# Patient Record
Sex: MALE | Race: WHITE | Employment: UNEMPLOYED | URBAN - METROPOLITAN AREA
[De-identification: names, ages, dates, MRNs, and addresses within clinical notes are randomized per-mention and may not be internally consistent; named-entity substitution may affect disease eponyms.]

---

## 2016-12-31 NOTE — PROGRESS NOTES
.  Lance Perdomo is a 36 year old male here for the following issues:    Left leg injury  Lance is a 35 yo male who reports he dropped a box of very heavy frozen chicken while working at [email or web address] in December 2016. It hit against the right side of his chest and hit the anterior left shin. He has bruising and local pain at the left anterior shin. He no longer works at [email or web address], this was seasonal work. His last day was 12/31/16. He is taking Aleve for pain.     Anxiety  He is interested in medication for treatment of anxiety. He has seen a psychiatrist, Dr Pedraza. Last visit was in Nov 2016 but this provider is no longer covered by his insurance. He is not taking any meds at this time. He has a history of alcoholism and reports he is drinking about 2 drinks per day. He saw a therapist on 1/6/17 (Dr. Enamorado, family counseling therapist). He has a meeting with his  on 1/12/17 regarding a DUI.       Alcoholism  He has been in numerous treatment facilities and is unable to stay sober.     Discussed referral to the [facility] psychiatry department for management of his anxiety and his alcoholism. He would like a referral.     Patient Active Problem List   Diagnosis     Anxiety     Chemical dependency (H)     Alcoholism (H)       Current Outpatient Prescriptions   Medication Sig Dispense Refill     citalopram (CELEXA) 20 MG tablet Take 1 tablet (20 mg) by mouth daily 60 tablet      hydrOXYzine (ATARAX) 25 MG tablet Take one-two tablets QID for anxiety 120 tablet      cephalexin 500 MG tablet Take 500 mg by mouth 4 times daily 40 tablet 0     doxycycline Monohydrate 100 MG TABS Take 2 tablets twice a day on day 1-3 then   Take 1 tablet twice daily on day 4-10 then  0     traMADol (ULTRAM) 50 MG tablet Take 1-2 tablets q 6 hr 28 tablet      naltrexone (DEPADE;REVIA) 50 MG tablet Take 1 tablet (50 mg) by mouth daily 30 tablet 0       Allergies   Allergen Reactions     Amoxicillin Nausea and Vomiting      vomiting     Augmentin Nausea and Vomiting     vomiting        EXAM  /102 mmHg  Pulse 96  Temp(Src) 98.2  F (36.8  C)  Wt 174 lb (78.926 kg)  SpO2 96%  Gen: Alert, pleasant, NAD  Chest wall: ecchymoses along right anterior chest wall, no crepitus, local tenderness  Right lower extremity: ecchymosis and local swelling along anterior right tibia, no open area      Assessment:  (S89.92XA) Injury of lower extremity, left, initial encounter  (primary encounter diagnosis)  Comment: work related injury, heavy box of chicken fell onto left anterior shin, contusion  Plan: recommend RICE. He declines xray today. Contact MD if pain not resolving, worsening    (F10.20) Alcoholism (H)  Comment: currently drinking  Plan: MENTAL HEALTH REFERRAL        Refer to psychiatry at the [facility]    (F41.9) Anxiety  Comment: currently poorly controlled, triggering drinking  Plan: MENTAL HEALTH REFERRAL, hydrOXYzine (VISTARIL)         25 MG capsule        For now refill Hydroxyzine, refer to Psychiatry at the [facility]    Willow Damian MD  Internal Medicine/Pediatrics

## 2017-01-04 ENCOUNTER — OFFICE VISIT (OUTPATIENT)
Dept: FAMILY MEDICINE | Facility: CLINIC | Age: 37
End: 2017-01-04

## 2017-01-04 VITALS
HEART RATE: 96 BPM | DIASTOLIC BLOOD PRESSURE: 102 MMHG | OXYGEN SATURATION: 96 % | BODY MASS INDEX: 24.97 KG/M2 | TEMPERATURE: 98.2 F | WEIGHT: 174 LBS | SYSTOLIC BLOOD PRESSURE: 148 MMHG

## 2017-01-04 DIAGNOSIS — F10.20 ALCOHOLISM (H): ICD-10-CM

## 2017-01-04 DIAGNOSIS — S89.92XA INJURY OF LOWER EXTREMITY, LEFT, INITIAL ENCOUNTER: Primary | ICD-10-CM

## 2017-01-04 DIAGNOSIS — F41.9 ANXIETY: ICD-10-CM

## 2017-01-04 RX ORDER — HYDROXYZINE PAMOATE 25 MG/1
CAPSULE ORAL
Qty: 120 CAPSULE | COMMUNITY
Start: 2017-01-04 | End: 2017-06-12

## 2017-01-04 ASSESSMENT — PAIN SCALES - GENERAL: PAINLEVEL: NO PAIN (0)

## 2017-01-04 NOTE — PATIENT INSTRUCTIONS
Restart home medications    Hydroxyzine 25mg capsule  Take 2 capsules up to 4 times a day for anxiety    Citalopram 20mg dose  May start with 1/2 tablet for one week then increase to a full tablet after that.     Psychiatry department will call you regarding an appointment      See your therapist on Friday 1/6/17.    Call your friend to resume AA meetings  Stop drinking.     Willow Damian MD  Internal Medicine/Pediatrics

## 2017-01-04 NOTE — NURSING NOTE
36 year old  Chief Complaint   Patient presents with     RECHECK     Derm Problem     Hit with a box of chicken x2 weeks left leg       Blood pressure 150/104, pulse 96, temperature 98.2  F (36.8  C), weight 174 lb (78.926 kg), SpO2 96 %. Body mass index is 24.97 kg/(m^2).  Patient Active Problem List   Diagnosis     Anxiety     Chemical dependency (H)     Alcoholism (H)       Wt Readings from Last 2 Encounters:   01/04/17 174 lb (78.926 kg)   11/11/16 170 lb (77.111 kg)     BP Readings from Last 3 Encounters:   01/04/17 150/104   11/11/16 121/87   10/19/16 144/91         Current Outpatient Prescriptions   Medication     citalopram (CELEXA) 20 MG tablet     hydrOXYzine (ATARAX) 25 MG tablet     naltrexone (DEPADE;REVIA) 50 MG tablet     No current facility-administered medications for this visit.       Social History   Substance Use Topics     Smoking status: Current Every Day Smoker -- 0.10 packs/day for 20 years     Types: Cigarettes     Smokeless tobacco: Never Used     Alcohol Use: 0.0 oz/week     0 Standard drinks or equivalent per week      Comment: daily       Health Maintenance Due   Topic Date Due     INFLUENZA VACCINE (SYSTEM ASSIGNED)  09/01/2016     LIPID SCREEN Q5 YR MALE (SYSTEM ASSIGNED)  09/19/2016       No results found for this basename: pap      January 4, 2017 2:15 PM

## 2017-01-04 NOTE — MR AVS SNAPSHOT
After Visit Summary   1/4/2017    Lance Perdomo    MRN: [id number]           Patient Information     Date Of Birth          1980        Visit Information        Provider Department      1/4/2017 2:20 PM Willow Damian MD [facility]        Today's Diagnoses     Injury of lower extremity, left, initial encounter    -  1     Alcoholism (H)         Anxiety           Care Instructions    Restart home medications    Hydroxyzine 25mg capsule  Take 2 capsules up to 4 times a day for anxiety    Citalopram 20mg dose  May start with 1/2 tablet for one week then increase to a full tablet after that.     Psychiatry department will call you regarding an appointment      See your therapist on Friday 1/6/17.    Call your friend to resume AA meetings  Stop drinking.     Willow Damian MD  Internal Medicine/Pediatrics          Follow-ups after your visit        Additional Services     MENTAL HEALTH REFERRAL       Your provider has referred you to:     [facility]: Psychiatry Clinic [facility] [phone]   [email or web address]    All scheduling is subject to the client's specific insurance plan & benefits, provider/location availability, and provider clinical specialities.  Please arrive 15 minutes early for your first appointment and bring your completed paperwork.    Please be aware that coverage of these services is subject to the terms and limitations of your health insurance plan.  Call member services at your health plan with any benefit or coverage questions.                  Who to contact     Please call your clinic at [phone] to:    Ask questions about your health    Make or cancel appointments    Discuss your medicines    Learn about your test results    Speak to your doctor   If you have compliments or concerns about an experience at your clinic, or if you wish to file a complaint, please contact [facility] Patient Relations  at [phone] or email us at [email or web address]         Additional Information About Your Visit        Online [employer] Information     [employer] is an electronic gateway that provides easy, online access to your medical records. With [employer], you can request a clinic appointment, read your test results, renew a prescription or communicate with your care team.     To sign up for [employer] visit the website at [email or web address]   You will be asked to enter the access code listed below, as well as some personal information. Please follow the directions to create your username and password.     Your access code is: [email or web address]  Expires: 2017  3:36 PM     Your access code will  in 90 days. If you need help or a new code, please contact your [facility] or call [phone] for assistance.        Care EveryWhere ID     This is your Care EveryWhere ID. This could be used by other organizations to access your [facility] [place] medical records  [id number]        Your Vitals Were     Pulse Temperature Pulse Oximetry             96 98.2  F (36.8  C) 96%          Blood Pressure from Last 3 Encounters:   17 148/102   16 121/87   10/19/16 144/91    Weight from Last 3 Encounters:   17 174 lb (78.926 kg)   16 170 lb (77.111 kg)   16 180 lb (81.647 kg)              We Performed the Following     MENTAL HEALTH REFERRAL        Primary Care Provider Office Phone # Fax #    [facility] [phone]       [address] [place] [address]        Thank you!     Thank you for choosing [facility]  for your care. Our goal is always to provide you with excellent care. Hearing back from our patients is one way we can continue to improve our services. Please take a few minutes to complete the written survey that you may receive in the mail after your visit with us. Thank you!             Your Updated Medication List -  Protect others around you: Learn how to safely use, store and throw away your medicines at www.disposemymeds.org.          This list is accurate as of: 1/4/17  2:56 PM.  Always use your most recent med list.                   Brand Name Dispense Instructions for use    citalopram 20 MG tablet    celeXA    60 tablet    Take 1 tablet (20 mg) by mouth daily       hydrOXYzine 25 MG capsule    VISTARIL    120 capsule    Take 1-2 po QID       hydrOXYzine 25 MG tablet    ATARAX    120 tablet    Take one-two tablets QID for anxiety       naltrexone 50 MG tablet    DEPADE;REVIA    30 tablet    Take 1 tablet (50 mg) by mouth daily

## 2017-01-24 ENCOUNTER — TELEPHONE (OUTPATIENT)
Dept: FAMILY MEDICINE | Facility: CLINIC | Age: 37
End: 2017-01-24

## 2017-01-24 DIAGNOSIS — S89.92XA: Primary | ICD-10-CM

## 2017-01-24 NOTE — TELEPHONE ENCOUNTER
----- Message from Yazmin Bradshaw sent at 1/23/2017  1:53 PM CST -----  Regarding: Pt would like an order for an xray put in and then call him back  Contact: [phone]  Pt would like an order for an xray put in and then call him back when the order has been done.  He would like to get the xray at  on 01/27/17 since he already has an appt on that day.  Pt can be reached at the phone number listed above    Thank you,    Jennifer TURNER  Call Ctr    Please DO NOT send this message and/or reply back to sender.  Call Center Representatives DO NOT respond to messages.

## 2017-01-24 NOTE — TELEPHONE ENCOUNTER
"Spoke to pt - states he continues to have left shin-area swelling and discomfort - was seen on Jan 4 for left shin pain, after a box fell on the area, at work in Dec.  Also complains about swelling of left ankle and \"some just below the left knee pain\". He will be at [facility] for an appt on Friday, and would like the left lower leg xrayed.  Discussed with Dr Damian - she did order left tib-fib xray; advised he should probably come in for further eval, but pt declines at this time, due to transportation concerns. Orders placed for xray of left LE - pt will call for appt at [facility].  He will call back prn concerns.  "

## 2017-01-27 ENCOUNTER — OFFICE VISIT (OUTPATIENT)
Dept: PSYCHIATRY | Facility: CLINIC | Age: 37
End: 2017-01-27
Attending: PSYCHIATRY & NEUROLOGY
Payer: COMMERCIAL

## 2017-01-27 ENCOUNTER — HOSPITAL ENCOUNTER (OUTPATIENT)
Dept: GENERAL RADIOLOGY | Facility: CLINIC | Age: 37
Discharge: HOME | End: 2017-01-27
Attending: INTERNAL MEDICINE | Admitting: INTERNAL MEDICINE
Payer: COMMERCIAL

## 2017-01-27 VITALS
BODY MASS INDEX: 24.48 KG/M2 | HEART RATE: 118 BPM | WEIGHT: 170.6 LBS | SYSTOLIC BLOOD PRESSURE: 143 MMHG | DIASTOLIC BLOOD PRESSURE: 95 MMHG

## 2017-01-27 DIAGNOSIS — S89.92XA: ICD-10-CM

## 2017-01-27 DIAGNOSIS — F10.20 ALCOHOLISM (H): Primary | ICD-10-CM

## 2017-01-27 DIAGNOSIS — F41.9 ANXIETY: ICD-10-CM

## 2017-01-27 PROCEDURE — 73590 X-RAY EXAM OF LOWER LEG: CPT | Mod: LT

## 2017-01-27 PROCEDURE — 99212 OFFICE O/P EST SF 10 MIN: CPT | Mod: ZF

## 2017-01-27 RX ORDER — GABAPENTIN 100 MG/1
100 CAPSULE ORAL 2 TIMES DAILY
Qty: 60 CAPSULE | Refills: 1 | Status: SHIPPED
Start: 2017-01-27 | End: 2017-04-11

## 2017-01-27 RX ORDER — CITALOPRAM HYDROBROMIDE 20 MG/1
30 TABLET ORAL DAILY
Qty: 30 TABLET | Refills: 1 | Status: SHIPPED
Start: 2017-01-27 | End: 2017-04-11

## 2017-01-27 NOTE — NURSING NOTE
Chief Complaint   Patient presents with     Recheck Medication     evaluation       Reviewed allergies, smoking status, and pharmacy preference  Administered abuse screening questions   Obtained weight, blood pressure and heart rate
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
## 2017-02-01 ASSESSMENT — PATIENT HEALTH QUESTIONNAIRE - PHQ9: SUM OF ALL RESPONSES TO PHQ QUESTIONS 1-9: 8

## 2017-04-11 ENCOUNTER — OFFICE VISIT (OUTPATIENT)
Dept: FAMILY MEDICINE | Facility: CLINIC | Age: 37
End: 2017-04-11

## 2017-04-11 VITALS
BODY MASS INDEX: 25.11 KG/M2 | SYSTOLIC BLOOD PRESSURE: 127 MMHG | DIASTOLIC BLOOD PRESSURE: 85 MMHG | TEMPERATURE: 98 F | WEIGHT: 175 LBS | OXYGEN SATURATION: 95 % | HEART RATE: 111 BPM

## 2017-04-11 DIAGNOSIS — F41.9 ANXIETY: Primary | ICD-10-CM

## 2017-04-11 DIAGNOSIS — M79.605 PAIN OF LEFT LOWER EXTREMITY: ICD-10-CM

## 2017-04-11 DIAGNOSIS — F10.280 ALCOHOL DEPENDENCE WITH ALCOHOL-INDUCED ANXIETY DISORDER (H): ICD-10-CM

## 2017-04-11 RX ORDER — GABAPENTIN 100 MG/1
100 CAPSULE ORAL 2 TIMES DAILY
Qty: 180 CAPSULE | Refills: 1 | Status: SHIPPED | OUTPATIENT
Start: 2017-04-11 | End: 2017-06-13

## 2017-04-11 RX ORDER — HYDROXYZINE HYDROCHLORIDE 25 MG/1
TABLET, FILM COATED ORAL
Qty: 120 TABLET | Refills: 11 | Status: SHIPPED | OUTPATIENT
Start: 2017-04-11 | End: 2017-06-13

## 2017-04-11 RX ORDER — NALTREXONE HYDROCHLORIDE 50 MG/1
50 TABLET, FILM COATED ORAL DAILY
Qty: 30 TABLET | Refills: 0 | Status: CANCELLED | OUTPATIENT
Start: 2017-04-11

## 2017-04-11 RX ORDER — CITALOPRAM HYDROBROMIDE 20 MG/1
30 TABLET ORAL DAILY
Qty: 135 TABLET | Refills: 1 | Status: SHIPPED | OUTPATIENT
Start: 2017-04-11 | End: 2017-06-13

## 2017-04-11 ASSESSMENT — PAIN SCALES - GENERAL: PAINLEVEL: MILD PAIN (2)

## 2017-04-11 NOTE — NURSING NOTE
36 year old  Chief Complaint   Patient presents with     Physical     Recheck Medication       Blood pressure 142/87, pulse 111, temperature 98  F (36.7  C), temperature source Oral, weight 175 lb (79.4 kg), SpO2 95 %. Body mass index is 25.11 kg/(m^2).  Patient Active Problem List   Diagnosis     Anxiety     Chemical dependency (H)     Alcoholism (H)       Wt Readings from Last 2 Encounters:   04/11/17 175 lb (79.4 kg)   01/04/17 174 lb (78.9 kg)     BP Readings from Last 3 Encounters:   04/11/17 142/87   01/04/17 (!) 148/102   11/11/16 121/87         Current Outpatient Prescriptions   Medication     citalopram (CELEXA) 20 MG tablet     gabapentin (NEURONTIN) 100 MG capsule     hydrOXYzine (VISTARIL) 25 MG capsule     hydrOXYzine (ATARAX) 25 MG tablet     naltrexone (DEPADE;REVIA) 50 MG tablet     No current facility-administered medications for this visit.        Social History   Substance Use Topics     Smoking status: Current Every Day Smoker     Packs/day: 0.10     Years: 20.00     Types: Cigarettes     Smokeless tobacco: Never Used     Alcohol use 0.0 oz/week     0 Standard drinks or equivalent per week      Comment: daily       Health Maintenance Due   Topic Date Due     LIPID SCREEN Q5 YR MALE (SYSTEM ASSIGNED)  09/19/2016       No results found for: PAP      April 11, 2017 10:21 AM

## 2017-04-11 NOTE — LETTER
[employer] Customer Service  [facility]  [address], [address]  [place] [address]  Fax: [phone]  Phone: [phone]      April 10, 2017      Lance Perdomo  [address]  [place] [address]        Dear Lance,    Thank you for your interest in becoming a [employer] user!    Your access code is: [email or web address]  Expires: 2017  8:17 AM     Please access the [employer] website at [email or web address].  Below the ID and password fields, select the  Sign Up Now  as New User.  You will be prompted to enter the access code listed above as well as additional personal information.  Please follow the directions carefully when creating your username and password.    If you allow your access code to , or if you have any questions please call a [employer] Representative at [phone] during normal clinic hours.     Sincerely,      [employer] Customer Service  [facility]

## 2017-04-11 NOTE — MR AVS SNAPSHOT
After Visit Summary   2017    Lance Perdomo    MRN: [id number]           Patient Information     Date Of Birth          1980        Visit Information        Provider Department      2017 10:00 AM Willow Damian MD [facility]        Today's Diagnoses     Preventative health care    -  1    Anxiety        Alcohol dependence with alcohol-induced anxiety disorder (H)           Follow-ups after your visit        Your next 10 appointments already scheduled     Apr 15, 2017 11:00 AM CDT   Return Visit with Rohit Spicer MD   [facility] ([facility]    [address], [address]  [facility] [address]   [phone]              Who to contact     Please call your clinic at [phone] to:    Ask questions about your health    Make or cancel appointments    Discuss your medicines    Learn about your test results    Speak to your doctor   If you have compliments or concerns about an experience at your clinic, or if you wish to file a complaint, please contact [facility] Patient Relations at [phone] or email us at [email or web address]         Additional Information About Your Visit        MyChart Information     [employer] is an electronic gateway that provides easy, online access to your medical records. With [employer], you can request a clinic appointment, read your test results, renew a prescription or communicate with your care team.     To sign up for [employer] visit the website at [email or web address]   You will be asked to enter the access code listed below, as well as some personal information. Please follow the directions to create your username and password.     Your access code is: [email or web address]  Expires: 2017  8:17 AM     Your access code will  in 90 days. If you need help or a new code, please contact your [facility] Clinic or call  [phone] for assistance.        Care EveryWhere ID     This is your Care EveryWhere ID. This could be used by other organizations to access your [facility] medical records  [id number]        Your Vitals Were     Pulse Temperature Pulse Oximetry BMI (Body Mass Index)          111 98  F (36.7  C) (Oral) 95% 25.11 kg/m2         Blood Pressure from Last 3 Encounters:   04/11/17 127/85   01/04/17 (!) 148/102   11/11/16 121/87    Weight from Last 3 Encounters:   04/11/17 175 lb (79.4 kg)   01/04/17 174 lb (78.9 kg)   11/11/16 170 lb (77.1 kg)              Today, you had the following     No orders found for display         Where to get your medicines      These medications were sent to [facility]/pharmacy #[id number] - [place], [place] - [address]  [address] [address]     Phone:  [phone]     citalopram 20 MG tablet    gabapentin 100 MG capsule    hydrOXYzine 25 MG tablet          Primary Care Provider Office Phone # Fax #    Willow Damian [phone]       [facility] [address]  [place] [address]        Thank you!     Thank you for choosing [facility]  for your care. Our goal is always to provide you with excellent care. Hearing back from our patients is one way we can continue to improve our services. Please take a few minutes to complete the written survey that you may receive in the mail after your visit with us. Thank you!             Your Updated Medication List - Protect others around you: Learn how to safely use, store and throw away your medicines at www.disposemymeds.org.          This list is accurate as of: 4/11/17  3:52 PM.  Always use your most recent med list.                   Brand Name Dispense Instructions for use    citalopram 20 MG tablet    celeXA    135 tablet    Take 1.5 tablets (30 mg) by mouth daily       gabapentin 100 MG capsule    NEURONTIN    180 capsule    Take 1 capsule (100 mg) by mouth 2 times daily       hydrOXYzine 25 MG  capsule    VISTARIL    120 capsule    Take 1-2 po QID       hydrOXYzine 25 MG tablet    ATARAX    120 tablet    Take one-two tablets QID for anxiety       naltrexone 50 MG tablet    DEPADE;REVIA    30 tablet    Take 1 tablet (50 mg) by mouth daily

## 2017-04-11 NOTE — PROGRESS NOTES
Lance Perdomo is a 36 year old male here for the following issues:    Depression/ Anxiety/ Alcoholism  Lance is a 35 yo with significant hx of depression and anxiety.  I referred him to psychiatry in January to help with worsening anxiety. He was told to increase celexa from 20mg to 30 mg daily . Gabapentin 100mg bid was added. He reports that helps with anxiety and chronic back pain. He also uses Hydroxyzine 25mg up to 4x per day. He was given rx for Natrexone, but ran out of medication. He thinks it helped him cut back on drinking. He tells me he is not going to return to the psychiatry clinic because he didn't like one of the doctors that he saw.  He continues to drink alcohol. Does not attend AA.     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 He has not tried NSADs, ice or heat. No swelling or redness. Seems to be aggravated when walking. He took a recent job that requires about 10 miles of walking a day.      Patient Active Problem List   Diagnosis     Anxiety     Chemical dependency (H)     Alcoholism (H)       Current Outpatient Prescriptions   Medication Sig Dispense Refill     citalopram (CELEXA) 20 MG tablet Take 1.5 tablets (30 mg) by mouth daily 30 tablet 1     gabapentin (NEURONTIN) 100 MG capsule Take 1 capsule (100 mg) by mouth 2 times daily 60 capsule 1     hydrOXYzine (VISTARIL) 25 MG capsule Take 1-2 po  capsule      hydrOXYzine (ATARAX) 25 MG tablet Take one-two tablets QID for anxiety 120 tablet      naltrexone (DEPADE;REVIA) 50 MG tablet Take 1 tablet (50 mg) by mouth daily 30 tablet 0     Social  Single  Living alone , in apartment  Working for [employer] , walking 15 miles a day    HABITS:  Tob: 2 cigarettes a day, since age 17  ETOH: still drinking on and off  Caffeine: 1/day  Exercise: walking for job    Allergies   Allergen Reactions      Amoxicillin Nausea and Vomiting     vomiting     Augmentin Nausea and Vomiting     vomiting        EXAM  /85  Pulse 111  Temp 98  F (36.7  C) (Oral)  Wt 175 lb (79.4 kg)  SpO2 95%  BMI 25.11 kg/m2  Gen: Alert, no distress  Left thigh: knee without redness, warmth or swelling  Generalized tenderness with palpation over the distal thigh, lateral side. No ecchymoses, no swelling    PHQ9 score = 9  AMANDA 7 score = 9    Assessment:  (F41.9) Anxiety  (primary encounter diagnosis)  Comment: drinking less but still feeling quite anxious  Plan: gabapentin (NEURONTIN) 100 MG capsule,         citalopram (CELEXA) 20 MG tablet, hydrOXYzine         (ATARAX) 25 MG tablet        Refilled medication, he declines returning to psych clinic for treatment,. I recommend he return to counseling.     (F10.280) Alcohol dependence with alcohol-induced anxiety disorder (H)  Comment: currently drinking, does not attend AA  Plan: informed him I would not fill naltrexone , he will need to see psychiatry for this. Recommend abstinence    (M79.605) Pain of left lower extremity  Comment: left thigh, suspect muscle strain  Plan: recommend NSAIDs, cool packs, stretches.   [place] [facility] [place].     Willow Damian MD  Internal Medicine/Pediatrics

## 2017-04-18 ASSESSMENT — ANXIETY QUESTIONNAIRES
3. WORRYING TOO MUCH ABOUT DIFFERENT THINGS: SEVERAL DAYS
2. NOT BEING ABLE TO STOP OR CONTROL WORRYING: MORE THAN HALF THE DAYS
5. BEING SO RESTLESS THAT IT IS HARD TO SIT STILL: NOT AT ALL
6. BECOMING EASILY ANNOYED OR IRRITABLE: MORE THAN HALF THE DAYS
GAD7 TOTAL SCORE: 9
7. FEELING AFRAID AS IF SOMETHING AWFUL MIGHT HAPPEN: SEVERAL DAYS
IF YOU CHECKED OFF ANY PROBLEMS ON THIS QUESTIONNAIRE, HOW DIFFICULT HAVE THESE PROBLEMS MADE IT FOR YOU TO DO YOUR WORK, TAKE CARE OF THINGS AT HOME, OR GET ALONG WITH OTHER PEOPLE: NOT DIFFICULT AT ALL
1. FEELING NERVOUS, ANXIOUS, OR ON EDGE: MORE THAN HALF THE DAYS

## 2017-04-18 ASSESSMENT — PATIENT HEALTH QUESTIONNAIRE - PHQ9: 5. POOR APPETITE OR OVEREATING: SEVERAL DAYS

## 2017-04-19 ENCOUNTER — TELEPHONE (OUTPATIENT)
Dept: NURSING | Facility: CLINIC | Age: 37
End: 2017-04-19

## 2017-04-19 ENCOUNTER — HOSPITAL ENCOUNTER (EMERGENCY)
Facility: CLINIC | Age: 37
Discharge: HOME | End: 2017-04-19
Attending: EMERGENCY MEDICINE | Admitting: EMERGENCY MEDICINE
Payer: COMMERCIAL

## 2017-04-19 VITALS
HEART RATE: 68 BPM | BODY MASS INDEX: 24.39 KG/M2 | TEMPERATURE: 97.9 F | SYSTOLIC BLOOD PRESSURE: 137 MMHG | WEIGHT: 170 LBS | RESPIRATION RATE: 16 BRPM | OXYGEN SATURATION: 99 % | DIASTOLIC BLOOD PRESSURE: 103 MMHG

## 2017-04-19 DIAGNOSIS — E83.42 HYPOMAGNESEMIA: ICD-10-CM

## 2017-04-19 DIAGNOSIS — F10.939 ALCOHOL WITHDRAWAL, WITH UNSPECIFIED COMPLICATION (H): ICD-10-CM

## 2017-04-19 DIAGNOSIS — F10.930 ALCOHOL WITHDRAWAL, UNCOMPLICATED (H): ICD-10-CM

## 2017-04-19 LAB
ALBUMIN SERPL-MCNC: 4.1 G/DL (ref 3.4–5)
ALCOHOL BREATH TEST: 0 (ref 0–0.01)
ALP SERPL-CCNC: 102 U/L (ref 40–150)
ALT SERPL W P-5'-P-CCNC: 61 U/L (ref 0–70)
ANION GAP SERPL CALCULATED.3IONS-SCNC: 9 MMOL/L (ref 3–14)
AST SERPL W P-5'-P-CCNC: 111 U/L (ref 0–45)
BILIRUB SERPL-MCNC: 1.1 MG/DL (ref 0.2–1.3)
BUN SERPL-MCNC: 9 MG/DL (ref 7–30)
CALCIUM SERPL-MCNC: 9.6 MG/DL (ref 8.5–10.1)
CHLORIDE SERPL-SCNC: 104 MMOL/L (ref 94–109)
CO2 SERPL-SCNC: 29 MMOL/L (ref 20–32)
CREAT SERPL-MCNC: 0.69 MG/DL (ref 0.66–1.25)
GFR SERPL CREATININE-BSD FRML MDRD: ABNORMAL ML/MIN/1.7M2
GLUCOSE SERPL-MCNC: 91 MG/DL (ref 70–99)
MAGNESIUM SERPL-MCNC: 1.3 MG/DL (ref 1.6–2.3)
POTASSIUM SERPL-SCNC: 3.8 MMOL/L (ref 3.4–5.3)
PROT SERPL-MCNC: 7.5 G/DL (ref 6.8–8.8)
SODIUM SERPL-SCNC: 142 MMOL/L (ref 133–144)

## 2017-04-19 PROCEDURE — 99284 EMERGENCY DEPT VISIT MOD MDM: CPT | Mod: Z6 | Performed by: EMERGENCY MEDICINE

## 2017-04-19 PROCEDURE — 25000132 ZZH RX MED GY IP 250 OP 250 PS 637: Performed by: EMERGENCY MEDICINE

## 2017-04-19 PROCEDURE — 80053 COMPREHEN METABOLIC PANEL: CPT | Performed by: EMERGENCY MEDICINE

## 2017-04-19 PROCEDURE — 96365 THER/PROPH/DIAG IV INF INIT: CPT

## 2017-04-19 PROCEDURE — 96361 HYDRATE IV INFUSION ADD-ON: CPT

## 2017-04-19 PROCEDURE — 99284 EMERGENCY DEPT VISIT MOD MDM: CPT | Mod: 25

## 2017-04-19 PROCEDURE — 82075 ASSAY OF BREATH ETHANOL: CPT | Performed by: EMERGENCY MEDICINE

## 2017-04-19 PROCEDURE — 25000125 ZZHC RX 250: Performed by: EMERGENCY MEDICINE

## 2017-04-19 PROCEDURE — 83735 ASSAY OF MAGNESIUM: CPT | Performed by: EMERGENCY MEDICINE

## 2017-04-19 PROCEDURE — 25000128 H RX IP 250 OP 636: Performed by: EMERGENCY MEDICINE

## 2017-04-19 RX ORDER — DIAZEPAM 5 MG
5 TABLET ORAL ONCE
Status: COMPLETED | OUTPATIENT
Start: 2017-04-19 | End: 2017-04-19

## 2017-04-19 RX ORDER — FOLIC ACID 20 MG
1 CAPSULE ORAL DAILY
COMMUNITY
End: 2017-06-12

## 2017-04-19 RX ORDER — MULTIPLE VITAMINS W/ MINERALS TAB 9MG-400MCG
1 TAB ORAL ONCE
Status: COMPLETED | OUTPATIENT
Start: 2017-04-19 | End: 2017-04-19

## 2017-04-19 RX ORDER — SODIUM CHLORIDE 9 MG/ML
1000 INJECTION, SOLUTION INTRAVENOUS CONTINUOUS
Status: DISCONTINUED | OUTPATIENT
Start: 2017-04-19 | End: 2017-04-20 | Stop reason: HOSPADM

## 2017-04-19 RX ORDER — LANOLIN ALCOHOL/MO/W.PET/CERES
100 CREAM (GRAM) TOPICAL ONCE
Status: COMPLETED | OUTPATIENT
Start: 2017-04-19 | End: 2017-04-19

## 2017-04-19 RX ORDER — NAPROXEN SODIUM 220 MG
440 TABLET ORAL 2 TIMES DAILY WITH MEALS
COMMUNITY
End: 2017-06-12

## 2017-04-19 RX ADMIN — DIAZEPAM 5 MG: 5 TABLET ORAL at 17:49

## 2017-04-19 RX ADMIN — SODIUM CHLORIDE 1000 ML: 9 INJECTION, SOLUTION INTRAVENOUS at 17:50

## 2017-04-19 RX ADMIN — DIAZEPAM 5 MG: 5 TABLET ORAL at 21:53

## 2017-04-19 RX ADMIN — Medication 500 MCG: at 18:20

## 2017-04-19 RX ADMIN — MULTIPLE VITAMINS W/ MINERALS TAB 1 TABLET: TAB at 17:49

## 2017-04-19 RX ADMIN — SODIUM CHLORIDE 1000 ML: 9 INJECTION, SOLUTION INTRAVENOUS at 18:35

## 2017-04-19 RX ADMIN — Medication 100 MG: at 17:49

## 2017-04-19 RX ADMIN — Medication 2 G: at 20:02

## 2017-04-19 ASSESSMENT — ENCOUNTER SYMPTOMS
NERVOUS/ANXIOUS: 1
VOMITING: 0
DIAPHORESIS: 1
SEIZURES: 0
SHORTNESS OF BREATH: 0
HEADACHES: 0
TREMORS: 1
PALPITATIONS: 0
NAUSEA: 0
WOUND: 1
WEAKNESS: 0
SORE THROAT: 0
NUMBNESS: 0
ABDOMINAL PAIN: 0

## 2017-04-19 ASSESSMENT — PATIENT HEALTH QUESTIONNAIRE - PHQ9: SUM OF ALL RESPONSES TO PHQ QUESTIONS 1-9: 8

## 2017-04-19 ASSESSMENT — ANXIETY QUESTIONNAIRES: GAD7 TOTAL SCORE: 9

## 2017-04-19 NOTE — ED AVS SNAPSHOT
[facility], Emergency Department    [address]    [place] [address]    Phone:  [phone]    Fax:  [phone]                                       Lance Perdomo   MRN: [id number]    Department:  [facility], Emergency Department   Date of Visit:  4/19/2017           Patient Information     Date Of Birth          1980        Your diagnoses for this visit were:     Alcohol withdrawal, uncomplicated (H)     Hypomagnesemia     Alcohol withdrawal, with unspecified complication (H)        You were seen by Dayanara France MD.        Discharge Instructions       Please make an appointment to follow up with Your Primary Care Provider as soon as possible for further management of alcohol use and withdrawal symptoms.  If you wish to pursue detox, you may call [phone] to ask about bed availability.  If you have any worsening symptoms including increasing tremors, hallucinations, intractable vomiting, chest paint or other concerns, return to the emergency department for re-evaluation.        Alcohol Withdrawal  Alcohol withdrawal usually begins after prolonged or heavy drinking for a number of days, and then you suddenly stop drinking, or cut down on your alcohol use. It is not one thing, but is a complex combination of signs and symptoms that generally occur to together and define a particular problem or condition.    Alcohol withdrawal is potentially life-threatening , and is a medical emergency    It can start as early as a couple of hours after your last drink, or may take 1 to 3 days to develop    It can last from days to a week or more.    It can worsen very quickly.  Signs and symptoms  There are 4 stages of alcohol withdrawal, although they overlap as do their signs and symptoms. In the earlier stages, it most commonly includes:    Anxiety    Shakiness    Nausea and vomiting    Sweating    Insomnia    Headaches    Fever    Mood swings, irritability, agitation,  restlessness    Confusion, disorientation, hallucinations  Delirium tremens (DTs)  DTs are the work stage of the alcohol withdrawal syndrome. If it happens, it usually begins about 3 to 5 days after your last drink. It is potentially life threatening. DTs are characterized by:    Sudden and severe mental or nervous system changes    Uncontrollable tremors    Severe disorientation, confusion, hallucinations    Heart racing, or irregular heartbeat    High blood pressure    Seizures    Possible coma and death  Home care    You will need plenty of rest and fluids over the next several days. Eat regular meals. Do not drink any more alcohol. During this time, it is best that you stay with family or friends who can help and support you. You can also admit yourself to a residential detox program.    Do not drive until all symptoms are gone and you are feeling better. Don't drive until you have been examined by a doctor if you've had a seizure.    If you were given sedative medication to reduce your symptoms, do not take it more often than prescribed and never take it with alcohol.  Follow-up care  Once you have gone through the withdrawal symptoms, you have fought half of the collazo. To avoid the risk of returning to your previous drinking pattern, it is essential that you get follow-up support and treatment.    Alcoholics Anonymous offers support through a self-help fellowship. There are no dues or fees. See the Yellow Pages and call for time and place of meetings. www.aa.org    [place] offers support to families of alcohol users. 139.219.3012 www.al-anon.org    National [place] On Alcoholism And Drug Dependence 907-487-4697 www.ncadd.org    Residential alcohol detox programs are available. Check the Yellow Pages under Drug Abuse & Treatment Centers.  Call 911  Call 911 if any of these occur:    Seizure    Trouble breathing or slow, irregular breathing    Chest pain    Sudden weakness on one side of the body or sudden  trouble speaking    Heavy bleeding or vomiting blood    Very drowsy or trouble awakening    Fainting or loss of consciousness    Rapid heart rate  When to seek medical advice  Call your health care provider right away if any of these occur:    Severe shakiness    Hallucinations    Fever over 100.4  F (38.0  C)    Headache, confusion, extreme drowsiness, inability to awaken    Increasing upper abdominal pain    Repeated vomiting    4507-1501 [employer]. [address] [place] [address], [place] [address]. All rights reserved. This information is not intended as a substitute for professional medical care. Always follow your healthcare professional's instructions.                 24 Hour Appointment Hotline       To make an appointment at any [facility], call [phone] ([phone]). If you don't have a family doctor or clinic, we will help you find one. [facility] clinics are conveniently located to serve the needs of you and your family.             Review of your medicines      Our records show that you are taking the medicines listed below. If these are incorrect, please call your family doctor or clinic.        Dose / Directions Last dose taken    ALEVE 220 MG tablet   Dose:  440 mg   Generic drug:  naproxen sodium        Take 440 mg by mouth 2 times daily (with meals)   Refills:  0        citalopram 20 MG tablet   Commonly known as:  celeXA   Dose:  30 mg   Quantity:  135 tablet        Take 1.5 tablets (30 mg) by mouth daily   Refills:  1        folic acid 20 MG Caps   Dose:  1 tablet        Take 1 tablet by mouth daily   Refills:  0        gabapentin 100 MG capsule   Commonly known as:  NEURONTIN   Dose:  100 mg   Quantity:  180 capsule        Take 1 capsule (100 mg) by mouth 2 times daily   Refills:  1        hydrOXYzine 25 MG capsule   Commonly known as:  VISTARIL   Quantity:  120 capsule        Take 1-2 po QID   Refills:  0        hydrOXYzine 25 MG tablet   Commonly known as:  ATARAX  "  Quantity:  120 tablet        Take one-two tablets QID for anxiety   Refills:  11        naltrexone 50 MG tablet   Commonly known as:  DEPADE;REVIA   Dose:  50 mg   Quantity:  30 tablet        Take 1 tablet (50 mg) by mouth daily   Refills:  0        UNKNOWN TO PATIENT   Dose:  1 tablet   Indication:  Trouble Sleeping        Take 1 tablet by mouth At Bedtime   Refills:  0                Procedures and tests performed during your visit     Alcohol breath test POCT    Comprehensive metabolic panel    Magnesium      Orders Needing Specimen Collection     None      Pending Results     No orders found from 2017 to 2017.            Pending Culture Results     No orders found from 2017 to 2017.            Thank you for choosing [facility]       Thank you for choosing [facility] for your care. Our goal is always to provide you with excellent care. Hearing back from our patients is one way we can continue to improve our services. Please take a few minutes to complete the written survey that you may receive in the mail after you visit with us. Thank you!        [facility] [employer] Information     [facility] lets you send messages to your doctor, view your test results, renew your prescriptions, schedule appointments and more. To sign up, go to [email or web address] . Click on \"Log in\" on the left side of the screen, which will take you to the Welcome page. Then click on \"Sign up Now\" on the right side of the page.     You will be asked to enter the access code listed below, as well as some personal information. Please follow the directions to create your username and password.     Your access code is: [email or web address]  Expires: 2017  8:17 AM     Your access code will  in 90 days. If you need help or a new code, please call your [facility] clinic or [phone].        Care EveryWhere ID     This is your Care EveryWhere ID. This could be used by other organizations to access your [facility] medical " records  [id number]        After Visit Summary       This is your record. Keep this with you and show to your community pharmacist(s) and doctor(s) at your next visit.

## 2017-04-19 NOTE — TELEPHONE ENCOUNTER
"Call Type: Triage Call    Presenting Problem: Lance has been drinking off and off.   Today  Lance feels that he is having withdrawals.  Today is  \"having  tremors.\"  [facility] Triage/disposition is to be seen within 4 hours and  Lance agreed.   [facility] had no openings so FNA advised to go to  Hospital.  Triage Note:  Guideline Title: Withdrawal Symptoms  Recommended Disposition: See Provider within 4 hours  Original Inclination: Wanted to speak with a nurse  Override Disposition:  Intended Action: Call PCP/HCP  Physician Contacted: No  New onset of mild to moderate tremors ?  YES  Seizure now or within last 6 hours ? NO  New or worsening confusion or disorientation ? NO  History of delirium tremens (DTs) in past while withdrawing AND beginning  withdrawal ? NO  Continuous or repeated vomiting for more than 8 hours AND unable to keep any  fluids down ? NO  Currently hallucinating ? NO  New or worsening episode(s) of agitation or out of control behavior ? NO  Elevated blood pressure of systolic above 160 or diastolic over 110 OR an increase  in blood pressure above a known reading within last 3 months of 30 systolic or 20  diastolic. ? NO  Any homicidal/destructive ideation, any suicidal ideation, any history of suicide  attempts, and/or any history of self destructive behavior ? NO  Early symptoms of withdrawal AND history of withdrawal seizures ? NO  New onset of changes in pulse such as irregular pulse, rapid pulse (greater than  120 beats/minute at rest) OR slow pulse (less than 50 beats per minute at rest) ?  NO  Physician Instructions:  Care Advice: Another adult should drive.  Should not be alone, arrange for support (family member, friend, etc.).  Call provider if symptoms worsen or new symptoms develop.  Call  if major symptoms of withdrawal (such as seizures,  hallucinations, or severe agitation).  CAUTIONS  SYMPTOM / CONDITION MANAGEMENT  List, or take, all current prescription(s), nonprescription or " alternative  medication(s) to provider for evaluation.  Avoid or limit use of caffeine, alcohol and non-prescription drugs.

## 2017-04-19 NOTE — LETTER
[facility], [place], EMERGENCY DEPARTMENT  [address] [address]  [phone]    2017    Lance Perdomo  [address]  [place] [address]  [phone] (home)     : 1980      To Whom it may concern:    Lance Perdomo was seen in our Emergency Department today, 2017. He may return to work on 17.      Sincerely,        Dayanara France

## 2017-04-19 NOTE — ED AVS SNAPSHOT
[facility], Emergency Department    [address]    [place] [address]    Phone:  [phone]    Fax:  [phone]                                       Lance Perdomo   MRN: [id number]    Department:  [facility], Emergency Department   Date of Visit:  4/19/2017           After Visit Summary Signature Page     I have received my discharge instructions, and my questions have been answered. I have discussed any challenges I see with this plan with the nurse or doctor.    ..........................................................................................................................................  Patient/Patient Representative Signature      ..........................................................................................................................................  Patient Representative Print Name and Relationship to Patient    ..................................................               ................................................  Date                                            Time    ..........................................................................................................................................  Reviewed by Signature/Title    ...................................................              ..............................................  Date                                                            Time
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
## 2017-04-20 ENCOUNTER — TELEPHONE (OUTPATIENT)
Dept: FAMILY MEDICINE | Facility: CLINIC | Age: 37
End: 2017-04-20

## 2017-04-20 NOTE — TELEPHONE ENCOUNTER
----- Message from Jacki Vazquez, RN sent at 4/13/2017  2:43 PM CDT -----  Regarding: FW: Pt requests doctor's note for employer, Dr. Damian  Contact: [phone]  LM for pt to call back - he can  letter - need to know if it needs anything beyond that he was in clinic for an appt.  ----- Message -----     From: Justice Mcgill     Sent: 4/12/2017  10:13 AM       To: [facility] Nurse Pool  Subject: Pt requests doctor's note for employer,  #    Pt requesting a note for employer verifying his appt with Dr. Damian yesterday, 4/11.  He has asked that it be emailed to him at [email or web address].  When I suggested he make his request through [employer] so it would be secure he stated he currently doesn't have internet access.  It was not clear how then, he can get emails.  Please call Lance at  [phone], today if possible.    Thanks for your help,  Justice Mcgill  Please DO NOT send this message and/or reply back to sender.  Call Center Representatives DO NOT respond to messages.

## 2017-04-20 NOTE — ED NOTES
Communicated to MD of patient's BP of 137/103.  MD ok for patient to discharge.  Patient discharged to home and encouraged to come back to ED if symptoms worsening.

## 2017-04-20 NOTE — DISCHARGE INSTRUCTIONS
Please make an appointment to follow up with Your Primary Care Provider as soon as possible for further management of alcohol use and withdrawal symptoms.  If you wish to pursue detox, you may call [phone] to ask about bed availability.  If you have any worsening symptoms including increasing tremors, hallucinations, intractable vomiting, chest paint or other concerns, return to the emergency department for re-evaluation.        Alcohol Withdrawal  Alcohol withdrawal usually begins after prolonged or heavy drinking for a number of days, and then you suddenly stop drinking, or cut down on your alcohol use. It is not one thing, but is a complex combination of signs and symptoms that generally occur to together and define a particular problem or condition.    Alcohol withdrawal is potentially life-threatening , and is a medical emergency    It can start as early as a couple of hours after your last drink, or may take 1 to 3 days to develop    It can last from days to a week or more.    It can worsen very quickly.  Signs and symptoms  There are 4 stages of alcohol withdrawal, although they overlap as do their signs and symptoms. In the earlier stages, it most commonly includes:    Anxiety    Shakiness    Nausea and vomiting    Sweating    Insomnia    Headaches    Fever    Mood swings, irritability, agitation, restlessness    Confusion, disorientation, hallucinations  Delirium tremens (DTs)  DTs are the work stage of the alcohol withdrawal syndrome. If it happens, it usually begins about 3 to 5 days after your last drink. It is potentially life threatening. DTs are characterized by:    Sudden and severe mental or nervous system changes    Uncontrollable tremors    Severe disorientation, confusion, hallucinations    Heart racing, or irregular heartbeat    High blood pressure    Seizures    Possible coma and death  Home care    You will need plenty of rest and fluids over the next several days. Eat regular meals. Do  not drink any more alcohol. During this time, it is best that you stay with family or friends who can help and support you. You can also admit yourself to a residential detox program.    Do not drive until all symptoms are gone and you are feeling better. Don't drive until you have been examined by a doctor if you've had a seizure.    If you were given sedative medication to reduce your symptoms, do not take it more often than prescribed and never take it with alcohol.  Follow-up care  Once you have gone through the withdrawal symptoms, you have fought half of the collazo. To avoid the risk of returning to your previous drinking pattern, it is essential that you get follow-up support and treatment.    Alcoholics Anonymous offers support through a self-help fellowship. There are no dues or fees. See the Yellow Pages and call for time and place of meetings. www.aa.org    [employer] offers support to families of alcohol users. 298.407.9793 www.al-anon.org    National [place] On Alcoholism And Drug Dependence 146-987-7030 www.ncadd.org    Residential alcohol detox programs are available. Check the Yellow Pages under Drug Abuse & Treatment Centers.  Call 911  Call 911 if any of these occur:    Seizure    Trouble breathing or slow, irregular breathing    Chest pain    Sudden weakness on one side of the body or sudden trouble speaking    Heavy bleeding or vomiting blood    Very drowsy or trouble awakening    Fainting or loss of consciousness    Rapid heart rate  When to seek medical advice  Call your health care provider right away if any of these occur:    Severe shakiness    Hallucinations    Fever over 100.4  F (38.0  C)    Headache, confusion, extreme drowsiness, inability to awaken    Increasing upper abdominal pain    Repeated vomiting    8654-1315 [employer]. [address] [place] [address], [place] [address]. All rights reserved. This information is not intended as a substitute for professional medical care.  Always follow your healthcare professional's instructions.

## 2017-04-21 ENCOUNTER — TELEPHONE (OUTPATIENT)
Dept: FAMILY MEDICINE | Facility: CLINIC | Age: 37
End: 2017-04-21
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
## 2017-06-12 ENCOUNTER — HOSPITAL ENCOUNTER (EMERGENCY)
Facility: CLINIC | Age: 37
Discharge: HOME | End: 2017-06-12
Admitting: EMERGENCY MEDICINE
Payer: COMMERCIAL

## 2017-06-12 ENCOUNTER — HOSPITAL ENCOUNTER (OUTPATIENT)
Dept: BEHAVIORAL HEALTH | Facility: CLINIC | Age: 37
End: 2017-06-12
Attending: PSYCHIATRY & NEUROLOGY
Payer: COMMERCIAL

## 2017-06-12 DIAGNOSIS — S01.81XA: ICD-10-CM

## 2017-06-12 PROCEDURE — 10020000 ZZH LODGING PLUS FACILITY CHARGE ADULT

## 2017-06-12 PROCEDURE — 40000007 ZZH STATISTIC ADULT CD FACE TO FACE-NO CHRG

## 2017-06-12 PROCEDURE — 99283 EMERGENCY DEPT VISIT LOW MDM: CPT | Mod: Z6 | Performed by: EMERGENCY MEDICINE

## 2017-06-12 PROCEDURE — 99282 EMERGENCY DEPT VISIT SF MDM: CPT
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
## 2017-06-13 ENCOUNTER — HOSPITAL ENCOUNTER (OUTPATIENT)
Dept: BEHAVIORAL HEALTH | Facility: CLINIC | Age: 37
End: 2017-06-13
Attending: PSYCHIATRY & NEUROLOGY
Payer: COMMERCIAL

## 2017-06-13 ASSESSMENT — ANXIETY QUESTIONNAIRES
1. FEELING NERVOUS, ANXIOUS, OR ON EDGE: MORE THAN HALF THE DAYS
2. NOT BEING ABLE TO STOP OR CONTROL WORRYING: MORE THAN HALF THE DAYS
7. FEELING AFRAID AS IF SOMETHING AWFUL MIGHT HAPPEN: MORE THAN HALF THE DAYS
3. WORRYING TOO MUCH ABOUT DIFFERENT THINGS: MORE THAN HALF THE DAYS
GAD7 TOTAL SCORE: 13
6. BECOMING EASILY ANNOYED OR IRRITABLE: NEARLY EVERY DAY
4. TROUBLE RELAXING: SEVERAL DAYS
5. BEING SO RESTLESS THAT IT IS HARD TO SIT STILL: SEVERAL DAYS
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
## 2017-06-13 NOTE — PROGRESS NOTES
Initial Services Plan        Before your first treatment group, please do the following    Immediate health & safety concerns: Look for sober housing and a supportive social network.  Obtain personal items (glasses, hearing aides, medicines, diabetes supplies, clothing, etc.).  Look for a support network (such as AA, NA, DBT group, a [facility] group, etc.)    Suggestions for client during the time between intake & completion of treatment plan:  Tour your treatment center (unit or outpatient clinic).  Introduce yourself to the treatment group.  Spend time getting to know your peers.  Complete your psycho-social paperwork.  Complete the problem list for your treatment plan.  Start drug and alcohol use history.  Review your patient or client handbook.  Identify concerns about whom to ask for family week    Client issues to be addressed in the first treatment sessions:  Identify motivations(s) for coming to treatment, i.e. legal, family, job, self      MARIBELL Blackmon  6/12/2017  12:30 PM
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
## 2017-06-13 NOTE — PROGRESS NOTES
[facility]  [address] [place] [address]                2017    Dear Janine Diaz,    I wanted to let you know that Lance Perdomo ( 1980) admitted in [facility] on 2017.  The [facility]ging [facility] program is an intensive Outpatient Program with Lodging that is 28 days long. His counselors are Vega Hector and Ganesh Moreau and they can be reached at [phone].     If you have any questions, please let us know.    Respectfully,        Teresa Méndez MA [facility]  CD Evaluation Counselor      (faxed with [facility] on 2017)
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
## 2017-06-14 ENCOUNTER — HOSPITAL ENCOUNTER (OUTPATIENT)
Dept: BEHAVIORAL HEALTH | Facility: CLINIC | Age: 37
Discharge: HOME | End: 2017-06-14
Attending: PSYCHIATRY & NEUROLOGY | Admitting: PSYCHIATRY & NEUROLOGY
Payer: COMMERCIAL

## 2017-06-14 ASSESSMENT — ANXIETY QUESTIONNAIRES: GAD7 TOTAL SCORE: 13

## 2017-06-14 ASSESSMENT — PATIENT HEALTH QUESTIONNAIRE - PHQ9: SUM OF ALL RESPONSES TO PHQ QUESTIONS 1-9: 14

## 2017-06-14 NOTE — DOWNTIME EVENT NOTE
The EMR was down for 7 hours on 6/14/2017.    RI was responsible for completing the paper charting during this time period.     The following information was re-entered into the system by Consuelo Boo: Allergies, Home meds and MAR    The following information will remain in the paper chart: Remainder of chart     Consuelo Boo  6/14/2017

## 2017-06-15 ENCOUNTER — HOSPITAL ENCOUNTER (OUTPATIENT)
Dept: BEHAVIORAL HEALTH | Facility: CLINIC | Age: 37
Discharge: HOME | End: 2017-06-15
Attending: PSYCHIATRY & NEUROLOGY | Admitting: PSYCHIATRY & NEUROLOGY
Payer: COMMERCIAL

## 2017-06-15 PROCEDURE — 10020000 ZZH LODGING PLUS FACILITY CHARGE ADULT

## 2017-06-15 PROCEDURE — H2035 A/D TX PROGRAM, PER HOUR: HCPCS | Mod: HQ

## 2017-06-15 PROCEDURE — H2035 A/D TX PROGRAM, PER HOUR: HCPCS

## 2017-06-15 PROCEDURE — 90792 PSYCH DIAG EVAL W/MED SRVCS: CPT | Performed by: PSYCHIATRY & NEUROLOGY
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
## 2017-06-16 ENCOUNTER — HOSPITAL ENCOUNTER (OUTPATIENT)
Dept: BEHAVIORAL HEALTH | Facility: CLINIC | Age: 37
End: 2017-06-16
Attending: PSYCHIATRY & NEUROLOGY
Payer: COMMERCIAL

## 2017-06-16 PROCEDURE — H2035 A/D TX PROGRAM, PER HOUR: HCPCS

## 2017-06-16 PROCEDURE — 10020000 ZZH LODGING PLUS FACILITY CHARGE ADULT

## 2017-06-16 PROCEDURE — H2035 A/D TX PROGRAM, PER HOUR: HCPCS | Mod: HQ

## 2017-06-16 NOTE — PROGRESS NOTES
Acknowledgement of Current Treatment Plan       I have reviewed my treatment plan with my therapist / counselor on 6/16/17. I agree with the plan as it is written in the electronic health record.    Name Signature   Lance Perdomo    Name of Therapist / Counselor    MARIBELL Landry

## 2017-06-16 NOTE — PROGRESS NOTES
Name: Lance Perdomo  Date: 6/15/2017  Medical Record: [id number]    Envelope Number: [id number]    List of Contents (List each item separately in new row):   1 Bottle of Gabapentin  1 Bottle of Gabapentin  1 Bottle of Citalopram    Admission:  I am responsible for any personal items that are not sent to the safe or pharmacy.  [facility] is not responsible for loss, theft or damage of any property in my possession.      Patient Signature:  ___________________________________________       Date/Time:__________________________    Staff Signature: __________________________________       Date/Time:__________________________    2nd Staff person, if patient is unable/unwilling to sign:      __________________________________________________________       Date/Time: __________________________      Discharge:  [facility] has returned all of my personal belongings:    Patient Signature: ________________________________________     Date/Time: ____________________________________    Staff Signature: ______________________________________     Date/Time:_____________________________________

## 2017-06-17 ENCOUNTER — HOSPITAL ENCOUNTER (OUTPATIENT)
Dept: BEHAVIORAL HEALTH | Facility: CLINIC | Age: 37
End: 2017-06-17
Attending: PSYCHIATRY & NEUROLOGY
Payer: COMMERCIAL

## 2017-06-17 ENCOUNTER — APPOINTMENT (OUTPATIENT)
Dept: GENERAL RADIOLOGY | Facility: CLINIC | Age: 37
End: 2017-06-17
Attending: EMERGENCY MEDICINE
Payer: COMMERCIAL

## 2017-06-17 ENCOUNTER — HOSPITAL ENCOUNTER (EMERGENCY)
Facility: CLINIC | Age: 37
Discharge: HOME | End: 2017-06-17
Attending: EMERGENCY MEDICINE | Admitting: EMERGENCY MEDICINE
Payer: COMMERCIAL

## 2017-06-17 VITALS
DIASTOLIC BLOOD PRESSURE: 71 MMHG | HEART RATE: 73 BPM | HEIGHT: 70 IN | OXYGEN SATURATION: 97 % | SYSTOLIC BLOOD PRESSURE: 108 MMHG | BODY MASS INDEX: 25.05 KG/M2 | TEMPERATURE: 98.2 F | RESPIRATION RATE: 16 BRPM | WEIGHT: 175 LBS

## 2017-06-17 DIAGNOSIS — M25.511 ACUTE PAIN OF RIGHT SHOULDER: ICD-10-CM

## 2017-06-17 PROCEDURE — 25000132 ZZH RX MED GY IP 250 OP 250 PS 637: Performed by: EMERGENCY MEDICINE

## 2017-06-17 PROCEDURE — 73030 X-RAY EXAM OF SHOULDER: CPT | Mod: RT

## 2017-06-17 PROCEDURE — 99283 EMERGENCY DEPT VISIT LOW MDM: CPT

## 2017-06-17 PROCEDURE — 99283 EMERGENCY DEPT VISIT LOW MDM: CPT | Mod: Z6 | Performed by: EMERGENCY MEDICINE

## 2017-06-17 PROCEDURE — 10020000 ZZH LODGING PLUS FACILITY CHARGE ADULT

## 2017-06-17 PROCEDURE — H2035 A/D TX PROGRAM, PER HOUR: HCPCS | Mod: HQ

## 2017-06-17 RX ORDER — IBUPROFEN 800 MG/1
800 TABLET, FILM COATED ORAL ONCE
Status: COMPLETED | OUTPATIENT
Start: 2017-06-17 | End: 2017-06-17

## 2017-06-17 RX ORDER — IBUPROFEN 600 MG/1
600 TABLET, FILM COATED ORAL EVERY 6 HOURS PRN
Qty: 20 TABLET | Refills: 0 | Status: SHIPPED | OUTPATIENT
Start: 2017-06-17

## 2017-06-17 RX ADMIN — IBUPROFEN 800 MG: 800 TABLET, FILM COATED ORAL at 10:23

## 2017-06-17 ASSESSMENT — ENCOUNTER SYMPTOMS
ABDOMINAL PAIN: 0
VOMITING: 0
NAUSEA: 0
HEADACHES: 0
EYES NEGATIVE: 1
ARTHRALGIAS: 0
SHORTNESS OF BREATH: 0
FEVER: 0
PSYCHIATRIC NEGATIVE: 1

## 2017-06-17 NOTE — PROGRESS NOTES
Patient returned to unit at this time from the ED with R shoulder sling and instructions for shoulder exercises. Patient verbalized an understanding of the importance of completing shoulder exercises and icing the area for pain control. Continue to support the patient in managing mobility (as he is R handed) and controlling pain with Rx for IBU.

## 2017-06-17 NOTE — ED NOTES
Gave D/C instructions, pt understood all instructions. Applied a sling to his right shoulder.  Gave ibuprofen prescription. Will walk back to [facility].

## 2017-06-17 NOTE — DISCHARGE INSTRUCTIONS
Please make an appointment to follow up with Orthopedics (phone: [phone]) in 7 days.  Wear a sling for comfort.  Do range of motion exercises at least 4-5 times a day.  Use ice to the sore area.  Take ibuprofen 600 mg every 6 hours or Tylenol 1 g every 8 hours as needed for pain.  You have a small irregularity that may be a fracture.  Please follow-up with orthopedics.
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
## 2017-06-17 NOTE — ED AVS SNAPSHOT
[facility], Emergency Department    [address]    [place] [address]    Phone:  [phone]    Fax:  [phone]                                       Lance Perdomo   MRN: [id number]    Department:  [facility], Emergency Department   Date of Visit:  6/17/2017           Patient Information     Date Of Birth          1980        Your diagnoses for this visit were:     Acute pain of right shoulder--possible avulsion fracture        You were seen by Holly Guerrero MD.        Discharge Instructions       Please make an appointment to follow up with Orthopedics (phone: [phone]) in 7 days.  Wear a sling for comfort.  Do range of motion exercises at least 4-5 times a day.  Use ice to the sore area.  Take ibuprofen 600 mg every 6 hours or Tylenol 1 g every 8 hours as needed for pain.  You have a small irregularity that may be a fracture.  Please follow-up with orthopedics.     Discharge References/Attachments     SHOULDER PAIN, UNCERTAIN CAUSE (ENGLISH)      Future Appointments        Provider Department Dept Phone Center    6/18/2017 8:00 AM [facility] [phone] [place]    6/19/2017 8:00 AM [facility] [phone] [place]    6/20/2017 8:00 AM [facility] [phone] [place]    6/21/2017 8:00 AM [facility] [phone] [place]    6/22/2017 8:00 AM [facility] [phone] [place]    6/23/2017 8:00 AM [facility] [phone] [place]    6/24/2017 8:00 AM [facility] [phone] [place]    6/25/2017 8:00 AM [facility] [phone] [place]    6/26/2017 8:00 AM [facility]  [phone] [place]    6/26/2017 1:20 PM Dana Hanna MD [facility] [phone] [facility]    6/27/2017 8:00 AM [facility] [phone] [place]    6/28/2017 8:00 AM [facility] [phone] [place]    6/29/2017 8:00 AM [facility] [phone] [place]    6/30/2017 8:00 AM [facility] [phone] [place]    7/1/2017 8:00 AM [facility] [phone] [place]    7/2/2017 8:00 AM [facility] [phone] [place]    7/3/2017 8:00 AM [facility] [phone] [place]    7/4/2017 8:00 AM [facility] [phone] [place]    7/5/2017 8:00 AM [facility] [phone] [place]    7/6/2017 8:00 AM [facility] [phone] [place]    7/7/2017 8:00 AM [facility] [phone] [place]    7/8/2017 8:00 AM [facility] [phone] [place]    7/9/2017 8:00 AM [facility] [phone] [place]      24 Hour Appointment Hotline       To make an appointment at any [place] clinic, call [phone] ([phone]). If you don't have a family doctor or clinic, we will help you find one. [place] clinics are conveniently located to serve the needs of you and your family.          ED Discharge Orders     FIORDALIZA GALEANO                    Review of your medicines      Our records show that you are taking the medicines listed below. If these are incorrect, please call your family doctor or clinic.        Dose  "/ Directions Last dose taken    gabapentin 300 MG capsule   Commonly known as:  NEURONTIN   Dose:  300 mg   Quantity:  90 capsule        Take 1 capsule (300 mg) by mouth 3 times daily as needed   Refills:  1        nicotine polacrilex 2 MG gum   Commonly known as:  [employer] NICOTINE POLACRILEX   Dose:  2 mg   Quantity:  30 tablet        Place 1 each (2 mg) inside cheek as needed for smoking cessation   Refills:  1        QUEtiapine 25 MG tablet   Commonly known as:  SEROQUEL   Dose:  25 mg   Quantity:  30 tablet        Take 1 tablet (25 mg) by mouth nightly as needed   Refills:  1                Procedures and tests performed during your visit     Shoulder XR, G/E 3 views, right      Orders Needing Specimen Collection     None      Pending Results     No orders found from 6/15/2017 to 6/18/2017.            Pending Culture Results     No orders found from 6/15/2017 to 6/18/2017.            Pending Results Instructions     If you had any lab results that were not finalized at the time of your Discharge, you can call the ED Lab Result RN at [phone]. You will be contacted by this team for any positive Lab results or changes in treatment. The nurses are available 7 days a week from 10A to 6:30P.  You can leave a message 24 hours per day and they will return your call.        Thank you for choosing [facility]       Thank you for choosing [facility] for your care. Our goal is always to provide you with excellent care. Hearing back from our patients is one way we can continue to improve our services. Please take a few minutes to complete the written survey that you may receive in the mail after you visit with us. Thank you!        [employer] Information     [employer] lets you send messages to your doctor, view your test results, renew your prescriptions, schedule appointments and more. To sign up, go to [email or web address] . Click on \"Log in\" on the left side of the screen, which will take you to the Welcome page. Then click on " "\"Sign up Now\" on the right side of the page.     You will be asked to enter the access code listed below, as well as some personal information. Please follow the directions to create your username and password.     Your access code is: [email or web address]  Expires: 2017  8:17 AM     Your access code will  in 90 days. If you need help or a new code, please call your [facility] clinic or [phone].        Care EveryWhere ID     This is your Care EveryWhere ID. This could be used by other organizations to access your [facility] medical records  [id number]        After Visit Summary       This is your record. Keep this with you and show to your community pharmacist(s) and doctor(s) at your next visit.                  "

## 2017-06-17 NOTE — ED NOTES
Pt as playing volley ball yesterday, should started hurting starting soon after starting to play.   Ice and Ibu all night long, challenge to get shirt on this am

## 2017-06-17 NOTE — PROGRESS NOTES
Patient came to nursing requesting ED evaluation r/t R shoulder injury during volleyball incurred yesterday evening. Patient states the shoulder pain and immobility has continued and slightly worsened since yesterday. He states he fell on his shoulder and hit his head playing volleyball. Patient ambulated independently to the ED and states he will check in with staff when he returns.

## 2017-06-17 NOTE — ED AVS SNAPSHOT
[facility], Emergency Department    [address]    [facility] [address]    Phone:  [phone]    Fax:  [phone]                                       Lance Perdomo   MRN: [id number]    Department:  [facility], Emergency Department   Date of Visit:  6/17/2017           After Visit Summary Signature Page     I have received my discharge instructions, and my questions have been answered. I have discussed any challenges I see with this plan with the nurse or doctor.    ..........................................................................................................................................  Patient/Patient Representative Signature      ..........................................................................................................................................  Patient Representative Print Name and Relationship to Patient    ..................................................               ................................................  Date                                            Time    ..........................................................................................................................................  Reviewed by Signature/Title    ...................................................              ..............................................  Date                                                            Time

## 2017-06-18 ENCOUNTER — HOSPITAL ENCOUNTER (OUTPATIENT)
Dept: BEHAVIORAL HEALTH | Facility: CLINIC | Age: 37
End: 2017-06-18
Attending: PSYCHIATRY & NEUROLOGY
Payer: COMMERCIAL

## 2017-06-18 PROCEDURE — 10020000 ZZH LODGING PLUS FACILITY CHARGE ADULT

## 2017-06-18 PROCEDURE — H2035 A/D TX PROGRAM, PER HOUR: HCPCS | Mod: HQ

## 2017-06-19 ENCOUNTER — HOSPITAL ENCOUNTER (OUTPATIENT)
Dept: BEHAVIORAL HEALTH | Facility: CLINIC | Age: 37
End: 2017-06-19
Attending: PSYCHIATRY & NEUROLOGY
Payer: COMMERCIAL

## 2017-06-19 PROCEDURE — H2035 A/D TX PROGRAM, PER HOUR: HCPCS | Mod: HQ

## 2017-06-19 PROCEDURE — 10020000 ZZH LODGING PLUS FACILITY CHARGE ADULT

## 2017-06-19 NOTE — PROGRESS NOTES
"D-Patient completed his \"First Step\" assignment and presented in group. Patient discussed the history of his addiction in terms of how it got started, where  him and how his tolerance developed. Patient also discussed his withdrawal symptoms and how trying to manage his addiction himself failed. Patient further discussed how his addiction lead him to erratic behavior and ultimately a separation from his family.  I-Group therapy, Counselor facilitated group discussion and conducted feedback session with group peers.  A-Patient appears to have a much better awareness of how his addiction has controlled all aspects of his life and coping skills he can use to combat his addiction. Feedback from his peers including the fact they he appears to demonstrate grandiose behavior which has lead him back to multiple treatments.  P-Continue with treatment and treatment plan objectives.  "

## 2017-06-20 ENCOUNTER — HOSPITAL ENCOUNTER (OUTPATIENT)
Dept: BEHAVIORAL HEALTH | Facility: CLINIC | Age: 37
End: 2017-06-20
Attending: PSYCHIATRY & NEUROLOGY
Payer: COMMERCIAL

## 2017-06-20 PROCEDURE — H2035 A/D TX PROGRAM, PER HOUR: HCPCS | Mod: HQ

## 2017-06-20 PROCEDURE — 10020000 ZZH LODGING PLUS FACILITY CHARGE ADULT

## 2017-06-20 PROCEDURE — 99214 OFFICE O/P EST MOD 30 MIN: CPT | Performed by: PSYCHIATRY & NEUROLOGY

## 2017-06-20 PROCEDURE — 99207 ZZC CDG-MDM COMPONENT: MEETS MODERATE - UP CODED: CPT | Performed by: PSYCHIATRY & NEUROLOGY

## 2017-06-20 NOTE — PROGRESS NOTES
"Patient:  Lance Perdomo            Adult CD Progress Note and Treatment Plan Review     Attendance  Please refer to OP BEH CD Adult Attendance Record Documentation Flowsheet    Support group attended this week: Yes    Reporting sobriety:  Yes    Treatment Plan     Treatment Plan Review competed on: 6/20/17      Client preferred learning style:   Hands On  Verbal  Demonstration    Staff Members contributing: MARIBELL Reyes, MARIBELL Landry. Isidra Gallardo, ALEXSANDER-T                         Received Supervision: No    Client: Contributed to goals and plans    Client received copy of plan/revised plan: Yes    Client agrees with plan/revised plan: Yes    Changes to Treatment Plan: None    New Goals added since last review: This is his first review    Goals worked on since last review: Safety Plan, First Step    Strategies effective: Yes    Strategies need these changes: None    ASA Risk Rating:    Dimension 1-0 Patient reported his last use date as 6/9/17. Patient reports no withdrawal symptomology a this time.  Dimension 2-1 Patient reports no biomedical concerns that would interfere with treatment program at [facility].  Dimension 3-2 Patient reports a diagnosis of depression and anxiety. Patient lacks insight and awareness of the connection between his substance abuse and resulting consequences. Patient participated in a suicide risk screening and was rated as a low/no risk. Patient completed a Safety Plan and turned it in to counselor. Patient reports that he will be seeing staff pyschiatrist and psychotherapist to address his mental and emotional concerns. Patient reports no suicidal ideation at this time.   Dimension 4-2 Patient has continued to use substances despite negative consequences. Patient lacks insight, awareness and motivation to stop abusing substances. Patient will be increasing his motivation for change in the coming weeks. Patient completed his \"First Step\" assignment and presented in " "group. Patient will be working on his \"Surrender to Win\" and his \"Winners Way\" assignments and will present in group when completed.    Dimension 5-4  Patient has been to ten prior treatments and has been unable to maintain sobriety. Patient lacks insight into his relapse problem potential including his relapse triggers, cues, and warning signs. Patient reports that overconfidence and grandiosity have been two of the main reasons for his relapses.   Dimension 6-4 Patient lacks a sober support network which includes a sponsor as well as structured daily activities in his life. Patient will work with counselors in the coming weeks to obtain a temporary sponsor, secure a sober living arrangement and develop and aftercare treatment program after the completing of treatment at [facility]. Patient reports that he will be participating in the \"Family Week\" program.    Any changes in Vulnerable Adult Status?  No  If yes, add to treatment plan and individual abuse prevention plan.    Family Involvement:   Patient completed KATIE for Family Week Program    Data:   Offered feedback and good insight client did actively participate    Intervention:   Aftercare Planning  Behavior Modification  Cognitive behavioral therapy  Counselor feedback  Education  Emotional Management  Group feedback    Assessment:   Stages of Change Model  Preparation/Determination    Appears/Sounds:  Cooperative  Motivated  Engaged      Plan:  Focus on recovery environment  Monitor emotional/physical health    ALEXSANDER Davis-T        "

## 2017-06-20 NOTE — PROGRESS NOTES
Interim history   This is a 36 year old male with Major depressive disorder, recurrent, without psychotic features.                          Social anxiety disorder.                          Alcohol use disorder, severe. .Pt seen . Patient's mood is good , less anxiety.,less depression  Fractured rt collar bone   Energy Level:MODERATE  Sleep:No concerns, sleeps well through night  Appetite:normal motivation interest are good   Denied any Suicidal/homicidal ideation/plan intent. Denied psychosis    Tolerating meds and has no side effects.         No prior suicide attempts      Past Medical History:   Diagnosis Date     Alcohol abuse, in remission 12/9/2014     Anxiety 12/9/2014     Depression        10 point ROS is negative except for Fractured rt shoulder collar bone   constitutional    HEENT - no symptoms   RESPIRATORY-no symptoms   CVS-no symptoms   GI-no symptoms  MUSCKULOSKELETAL -no symptoms  NEUROLOGICAL-no symptoms  ENDOCRINE-no symptoms  HEMATAOLOGY-no symptoms  SKIN-no symptoms  Family History   Problem Relation Age of Onset     Depression Mother      Anxiety Disorder Mother      Anxiety Disorder Maternal Grandmother      Depression Maternal Grandfather      Depression Paternal Grandmother      Anxiety Disorder Paternal Grandmother      Parkinsonism Paternal Grandfather      Unknown/Adopted Paternal Grandfather      Bipolar Disorder Sister      Schizophrenia Sister      Unknown/Adopted Father      Suicide No family hx of      Substance Abuse No family hx of      Dementia No family hx of      Anne Disease No family hx of      Autism Spectrum Disorder No family hx of      Intellectual Disability (Mental Retardation) No family hx of      MENTAL ILLNESS No family hx of      Social History     Social History     Marital status: Single     Spouse name: N/A     Number of children: N/A     Years of education: N/A     Occupational History     Not on file.     Social History Main Topics      Smoking status: Current Every Day Smoker     Packs/day: 0.10     Years: 20.00     Types: Cigarettes     Smokeless tobacco: Never Used     Alcohol use 0.0 oz/week     0 Standard drinks or equivalent per week      Comment: daily, last use was 2 weeks ago     Drug use: No      Comment: occ     Sexual activity: Not Currently     Other Topics Concern     Not on file     Social History Narrative   FAMILY HISTORY:  Sister has bipolar.  Mother has depression and anxiety.  Father's side aunt has alcohol issues.       SOCIAL HISTORY:  Born in the [facility], good childhood.  He was never abused.  He has a sociology degree.  He worked as a .  He then could not work and now he is presently unemployed but previously worked as a  and .  Support system consists of family.  Stressors include unemployment, money.          Medications:        Current Outpatient Prescriptions on File Prior to Encounter:  ibuprofen (ADVIL/MOTRIN) 600 MG tablet Take 1 tablet (600 mg) by mouth every 6 hours as needed for moderate pain   QUEtiapine (SEROQUEL) 25 MG tablet Take 1 tablet (25 mg) by mouth nightly as needed   gabapentin (NEURONTIN) 300 MG capsule Take 1 capsule (300 mg) by mouth 3 times daily as needed   nicotine polacrilex ([facility] NICOTINE POLACRILEX) 2 MG gum Place 1 each (2 mg) inside cheek as needed for smoking cessation     Current Facility-Administered Medications on File Prior to Encounter:  Self Administer Medications: Behavioral Services          Allergies:     Allergies   Allergen Reactions     Amoxicillin Nausea and Vomiting     vomiting     Augmentin Nausea and Vomiting     vomiting            Psychiatric Examination:     Weight is 0 lbs 0 oz  There is no height or weight on file to calculate BMI.    Appearance:  awake, alert and adequately groomed  Attitude:  cooperative  Eye Contact:  good  Mood:  better  Affect:  appropriate and in normal range and mood congruent  Speech:  clear, coherent  rate /rhythm are good  Psychomotor Behavior:  no evidence of tardive dyskinesia, dystonia, or tics and intact station, gait and muscle tone  Throught Process:  logical  Associations:  no loose associations  Thought Content:  no evidence of suicidal ideation or homicidal ideation, no evidence of psychotic thought, no auditory hallucinations present and no visual hallucinations present  Insight:  good  Judgement:  intact  Oriented to:  time, person, and place  Attention Span and Concentration:  intact  Recent and Remote Memory:  intact  Language fund of knowledge are adequate               Labs:     No results found for: NTBNPI, NTBNP  Lab Results   Component Value Date    WBC 4.4 07/16/2016    HGB 12.5 (L) 07/16/2016    HCT 37.8 (L) 07/16/2016     (H) 07/16/2016     (L) 07/16/2016     Lab Results   Component Value Date    TSH 3.62 07/12/2016           DIagnoses:            Plan:   Major depressive disorder, recurrent, without psychotic features.                          Social anxiety disorder.                          Alcohol use disorder, severe.       PLAN:  T Seroquel 25 mg.  This is patient's choice, it helps improve his sleep.  gabapentin increased to 300 mg 3 times a day and continue Celexa 30 mg.  The patient will return to me in 1 week or earlier if there is a problem.  The patient wants to quit smoking and nicotine gum ordered for him.     Continue present meds  F/u as per counselours    Able to give informed consent:  YES       Discussed Risks/Benefits/Side Effects/Alternatives: YES      Discussed with patient many issues of addiction,triggers/ relapse, and establishing a solid recovery program.

## 2017-06-20 NOTE — PROGRESS NOTES
"Met with pt to complete the \"Patient Safety Template\" form. Pt and staff signed form. Pt received a copy of it and the original form has been placed in the pt's paper chart to be scanned into his electronic medical record upon pt's discharge from LP.     "

## 2017-06-21 ENCOUNTER — HOSPITAL ENCOUNTER (OUTPATIENT)
Dept: BEHAVIORAL HEALTH | Facility: CLINIC | Age: 37
End: 2017-06-21
Attending: PSYCHIATRY & NEUROLOGY
Payer: COMMERCIAL

## 2017-06-21 PROCEDURE — H2035 A/D TX PROGRAM, PER HOUR: HCPCS | Mod: HQ

## 2017-06-21 PROCEDURE — 10020000 ZZH LODGING PLUS FACILITY CHARGE ADULT

## 2017-06-22 ENCOUNTER — HOSPITAL ENCOUNTER (OUTPATIENT)
Dept: BEHAVIORAL HEALTH | Facility: CLINIC | Age: 37
End: 2017-06-22
Attending: PSYCHIATRY & NEUROLOGY
Payer: COMMERCIAL

## 2017-06-22 PROCEDURE — H2035 A/D TX PROGRAM, PER HOUR: HCPCS

## 2017-06-22 PROCEDURE — H2035 A/D TX PROGRAM, PER HOUR: HCPCS | Mod: HQ

## 2017-06-22 PROCEDURE — 10020000 ZZH LODGING PLUS FACILITY CHARGE ADULT

## 2017-06-22 NOTE — PROGRESS NOTES
"INDIVIDUAL SESSION SUMMARY    D) Met with client on 6/22/17 from 2:30-3:20. Client reported feeling \"less anxious\", that his \"head is feeling clear\", and that he is sleeping better but finding himself sleepy during the afternoon. Therapist suggested rolling back bedtime a bit earlier. Client spoke about how he is trying to slow down and is working on accepting the recommendation for aftercare at [facility], shared this info with his family and has set up a phone interview for 6/28/17. Client reported that he has a Dr. parkt on 6/26 to learn more about medication to help with cravings. 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 Therapist shared information with client on [facility] and their social work services/case workers as well as their residential and mental health services.   I) Individual session with client. Provided client with verbal interventions including: validation, nurturing, and support.  A) Client appears less emotionally constricted, calmer and to have a better understanding of the health consequences of his using. Client appears to be using time with peers and socializing as a way to help with stress management. Client appears to lack a sober support network and meaningful activities for free time outside of working. Client appears to be gaining some internal motivation and to be listening to guidance from his counselors.   P) Next session is scheduled for 6/29/17  NEELA Boggs  6/22/2017    "

## 2017-06-23 ENCOUNTER — HOSPITAL ENCOUNTER (OUTPATIENT)
Dept: BEHAVIORAL HEALTH | Facility: CLINIC | Age: 37
End: 2017-06-23
Attending: PSYCHIATRY & NEUROLOGY
Payer: COMMERCIAL

## 2017-06-23 PROCEDURE — H2035 A/D TX PROGRAM, PER HOUR: HCPCS | Mod: HQ

## 2017-06-23 PROCEDURE — 10020000 ZZH LODGING PLUS FACILITY CHARGE ADULT

## 2017-06-23 NOTE — PROGRESS NOTES
Dim 4 Risk 2  D) Pt completed and presented in group his chemical use history progression and consequences assignment. Pt shared that his consequences include getting three DUIs, multiple admissions to the hospital, loss of jobs, and detriment to relationships.  I) Staff facilitated group session and both staff and fellow group members gave feedback.   A) Pt appears to have identified the major consequences of his chemical use. Pt seems to have gained awareness of how his use has been directly related to his consequences.   P) Continue with tx plan.

## 2017-06-24 ENCOUNTER — HOSPITAL ENCOUNTER (OUTPATIENT)
Dept: BEHAVIORAL HEALTH | Facility: CLINIC | Age: 37
End: 2017-06-24
Attending: PSYCHIATRY & NEUROLOGY
Payer: COMMERCIAL

## 2017-06-24 PROCEDURE — H2035 A/D TX PROGRAM, PER HOUR: HCPCS | Mod: HQ

## 2017-06-24 PROCEDURE — 10020000 ZZH LODGING PLUS FACILITY CHARGE ADULT

## 2017-06-25 ENCOUNTER — HOSPITAL ENCOUNTER (OUTPATIENT)
Dept: BEHAVIORAL HEALTH | Facility: CLINIC | Age: 37
End: 2017-06-25
Attending: PSYCHIATRY & NEUROLOGY
Payer: COMMERCIAL

## 2017-06-25 PROCEDURE — H2035 A/D TX PROGRAM, PER HOUR: HCPCS | Mod: HQ

## 2017-06-25 PROCEDURE — 10020000 ZZH LODGING PLUS FACILITY CHARGE ADULT

## 2017-06-26 ENCOUNTER — HOSPITAL ENCOUNTER (OUTPATIENT)
Dept: BEHAVIORAL HEALTH | Facility: CLINIC | Age: 37
End: 2017-06-26
Attending: PSYCHIATRY & NEUROLOGY
Payer: COMMERCIAL

## 2017-06-26 ENCOUNTER — OFFICE VISIT (OUTPATIENT)
Dept: ADDICTION MEDICINE | Facility: CLINIC | Age: 37
End: 2017-06-26
Payer: COMMERCIAL

## 2017-06-26 VITALS
HEART RATE: 97 BPM | WEIGHT: 175.5 LBS | RESPIRATION RATE: 16 BRPM | OXYGEN SATURATION: 99 % | HEIGHT: 70 IN | DIASTOLIC BLOOD PRESSURE: 68 MMHG | SYSTOLIC BLOOD PRESSURE: 118 MMHG | TEMPERATURE: 98.4 F | BODY MASS INDEX: 25.13 KG/M2

## 2017-06-26 DIAGNOSIS — F10.20 ALCOHOL USE DISORDER, SEVERE, DEPENDENCE (H): Primary | ICD-10-CM

## 2017-06-26 DIAGNOSIS — F40.10 SOCIAL ANXIETY DISORDER: ICD-10-CM

## 2017-06-26 DIAGNOSIS — F17.200 NICOTINE USE DISORDER: ICD-10-CM

## 2017-06-26 LAB
AMPHETAMINES UR QL: ABNORMAL NG/ML
BARBITURATES UR QL SCN: ABNORMAL NG/ML
BENZODIAZ UR QL SCN: ABNORMAL NG/ML
BUPRENORPHINE UR QL: ABNORMAL NG/ML
CANNABINOIDS UR QL: ABNORMAL NG/ML
COCAINE UR QL SCN: ABNORMAL NG/ML
D-METHAMPHET UR QL: ABNORMAL NG/ML
METHADONE UR QL SCN: ABNORMAL NG/ML
OPIATES UR QL SCN: ABNORMAL NG/ML
OXYCODONE UR QL SCN: ABNORMAL NG/ML
PCP UR QL SCN: ABNORMAL NG/ML
PROPOXYPH UR QL: ABNORMAL NG/ML
TRICYCLICS UR QL SCN: ABNORMAL NG/ML

## 2017-06-26 PROCEDURE — 99215 OFFICE O/P EST HI 40 MIN: CPT | Performed by: PEDIATRICS

## 2017-06-26 PROCEDURE — 80306 DRUG TEST PRSMV INSTRMNT: CPT | Performed by: PEDIATRICS

## 2017-06-26 PROCEDURE — 10020000 ZZH LODGING PLUS FACILITY CHARGE ADULT

## 2017-06-26 PROCEDURE — H2035 A/D TX PROGRAM, PER HOUR: HCPCS | Mod: HQ

## 2017-06-26 NOTE — PROGRESS NOTES
"        Adult CD Progress Note and Treatment Plan Review     Attendance  Please refer to OP BEH CD Adult Attendance Record Documentation Flowsheet    Support group attended this week: yes    Reporting sobriety:  yes    Treatment Plan     Treatment Plan Review competed on: 6/26/2017      Client preferred learning style: Hands on  Verbal    Staff Members contributing: Shamar Hector, Lead Counselor, [facility]; Ganesh Moreau, [facility]; Favio Barkley, [facility]                        Received Supervision: No    Client: contributed to goals and plan.    Client received copy of plan/revised plan: N/A    Client agrees with plan/revised plan: Yes    Changes to Treatment Plan: No    New Goals added since last review: None      Goals worked on since last review: See below Dimensions/risk ratings.    Strategies effective: yes    Strategies need these changes: None    ASAM Risk Rating:    Dimension 1 0 No changes    Dimension 2 1 No changes    Dimension 3 2 Pt.is staying med compliant.Pt.has seen the the staff psychotherapist.Pt.reported no suicidal ideation this week.Pt.is working on his \"Dear Dad\" letter.    Dimension 4 2 Pt.presented his drug use history and consequences of use assignment.Pt.continues to work on increasing his internal motivation to change his life style to maintain sobriety.Pt.has been active in the group process.    Dimension 5 4 Pt.attended the relapse prevention workshop.He was asked to identify his relapse warning signs and triggers in the areas of people,places,activities and feelings.Pt.made some progress in this areabut will need more work. Pt.is working on his \"Emotional Maturity\" assignment.    Dimension 6 4 Pt.has invited his family to participate in the family program.Pt.is attending 5 12 step meetings per week while in treatment.Pt.needs to obtain a sponsor.    Any changes in Vulnerable Adult Status?  No  If yes, add to treatment plan and individual abuse prevention plan.    Family Involvement:   KATIE " signed    Data:   offered feedback, good insight, client did actively participate  Patient continues to be active in group therapy.      Intervention:   Counselor feedback  Group feedback    Assessment:   Stages of Change Model  Action    Appears/Sounds:  Cooperative  Motivated  Engaged      Plan:  Continue group therapy.

## 2017-06-26 NOTE — PROGRESS NOTES
SUBJECTIVE:                                                        Lance Perdomo is a 36 year old male who presents for  initial visit for addiction consultation and management referred by Dr. Leslie    HPI:   Here today for possible Vivitrol     CD History:  Was [facility] [address] for Detox for six days prior to starting [facility] on 6/13.         DRUG OF CHOICE - Alcohol       LAST USE:  Early June.   Past 10 yr in out of treament/outpatient/detox.  Several few month stretches and longest stretch of sobriety 10mo.    This recent time has been drinking up to 1 L to 1 1/2 L /day HL for last several year.   Detox 6x in past but 3 x in past several months.    No seizure.  Has had DT.      LONGEST PERIOD OF SOBRIETY-10mo.    PREVIOUS DETOX/TREATMENT PROGRAMS-Ten inpatient tx and some outpatient.  This is 3rd LP.  [facility], [facility]      PREVIOUS MEDICATION ASSISTED TREATMENT:  Naltrexone and campral in past.  Would like to try Vivitrol.  Interview for Leesa Avelar       Other Substances:    ALCOHOL-as above  MARIJUANA-daily from age 17 unless on probation.   COCAINE/CRACK-cocaine used when available.   METH/AMPHETAMINES-None  OPIATES-OXY quite a bit in college.    BENZODIAZEPINES-Rx in past but took as prescribed.   #60 for four months.    Acid/Mushroom-high school/college.     NICOTINE-smoke since age 17.   Was about 3 cig /day.   PPD   Desire to quit maybe         Hx of medication for smoking cessation  none      PAST PSYCHIATRIC HISTORY   Anxiety.  Some hx of seasonal depression.   Social anxiety mostly.  Celexa currently.   Has taken other in past but drinking.          Patient Active Problem List    Diagnosis Date Noted     Alcoholism (H) 08/02/2016     Priority: Medium     Chemical dependency (H) 01/07/2015     Priority: Medium     Anxiety 12/09/2014     Priority: Medium       Problem list and histories reviewed & adjusted, as indicated.  Additional history: as documented     Past Medical  History:   Diagnosis Date     Alcohol abuse, in remission 12/9/2014     Anxiety 12/9/2014     Depression        Past Surgical History:   Procedure Laterality Date     NO HISTORY OF SURGERY           Family History   Problem Relation Age of Onset     Depression Mother      Anxiety Disorder Mother      Anxiety Disorder Maternal Grandmother      Depression Maternal Grandfather      Depression Paternal Grandmother      Anxiety Disorder Paternal Grandmother      Parkinsonism Paternal Grandfather      Unknown/Adopted Paternal Grandfather      Bipolar Disorder Sister      Schizophrenia Sister      Unknown/Adopted Father      Suicide No family hx of      Substance Abuse No family hx of      Dementia No family hx of      [place] Disease No family hx of      Autism Spectrum Disorder No family hx of      Intellectual Disability (Mental Retardation) No family hx of      MENTAL ILLNESS No family hx of          Social History     Social History Narrative    Finished college, .   No sig other.  No kids.   Legal on Prob DWI 2 yr ago (2 prior -didn't drive for 10yr).           Current Outpatient Prescriptions   Medication Sig Dispense Refill     ibuprofen (ADVIL/MOTRIN) 600 MG tablet Take 1 tablet (600 mg) by mouth every 6 hours as needed for moderate pain 20 tablet 0     QUEtiapine (SEROQUEL) 25 MG tablet Take 1 tablet (25 mg) by mouth nightly as needed 30 tablet 1     gabapentin (NEURONTIN) 300 MG capsule Take 1 capsule (300 mg) by mouth 3 times daily as needed 90 capsule 1     nicotine polacrilex ([facility] NICOTINE POLACRILEX) 2 MG gum Place 1 each (2 mg) inside cheek as needed for smoking cessation 30 tablet 1         Allergies   Allergen Reactions     Amoxicillin Nausea and Vomiting     vomiting     Augmentin Nausea and Vomiting     vomiting         [place] Board of Pharmacy Data Base Reviewed:    NO;       REVIEW OF SYSTEMS:    General: No acute withdrawal symptoms.  No recent infections or fever  Eyes: Negative  "for vision changes or eye problems  ENT: No problems with ears, nose or throat.  No difficulty swallowing.  Resp: No coughing, wheezing or shortness of breath  CV: No chest pains or palpitations  GI: No nausea, vomiting, abdominal pain, diarrhea, constipation  : No urinary frequency or dysuria,     Musculoskeletal: No significant muscle or joint pains, No edema  Neurologic: No numbness, tingling, weakness, problems with balance or coordination  Psychiatric: none  Skin: No rashes    LAB  Liver Function Studies -   Recent Labs   Lab Test  04/19/17   1756   PROTTOTAL  7.5   ALBUMIN  4.1   BILITOTAL  1.1   ALKPHOS  102   AST  111*   ALT  61     Patient reports similar labs at recent detox. Records requested.         OBJECTIVE:                                                      EXAM    Blood pressure 118/68, pulse 97, temperature 98.4  F (36.9  C), temperature source Oral, resp. rate 16, height 5' 10\" (1.778 m), weight 175 lb 8 oz (79.6 kg), SpO2 99 %.    GENERAL APPEARANCE: healthy, alert and no distress  EYES: Eyes grossly normal to inspection, PERRL and no nystagmus   HENT: ear canals and TM's normal, nose and mouth without ulcers or lesions and normal cephalic/atraumatic  NECK: no adenopathy, thyromegaly or masses  RESP: lungs clear to auscultation - no rales, rhonchi or wheezes and no resp distress  CV: regular rates and rhythm, normal S1 S2, no S3 or S4 and no murmur, click or rub  ABDOMEN: soft, nontender, without hepatosplenomegaly or masses  MS: extremities normal- no gross deformities noted, gait normal, peripheral pulses normal and no edema  SKIN: no rashes, no jaundice, no obvious masses.   NEURO: Normal strength and tone, sensory exam grossly normal, no tremor  MENTAL STATUS EXAM:  Appearance/Behavior: No apparent distress  Speech: Normal  Mood/Affect: normal affect  Insight: Adequate    Results for orders placed or performed during the hospital encounter of 06/17/17   Shoulder XR, G/E 3 views, right    " Narrative    XR SHOULDER RT G/E 3 VW 6/17/2017 9:31 AM    COMPARISON: None.    HISTORY: Fall yesterday playing volleyball, acromioclavicular joint  tenderness.      Impression    IMPRESSION: Tiny cortical irregularity at the superior aspect of the  distal acromion, may represent a minimally displaced avulsion  fracture. No other fractures are suspected in the right shoulder.  Normal alignment of the glenohumeral and acromioclavicular joints.    YARIEL FAUSTIN                        ASSESSMENT/PLAN:    (F10.20) Alcohol use disorder, severe, dependence (H)  (primary encounter diagnosis)  Comment: desires Vivitrol  Plan: Medications for alcohol dependence reviewed.  Disulfiram, Acamprosate and Naltrexone/Vivitrol all reviewed.  Risk, benefit, side effects and intended purposes of each reviewed.   Effect of Naltrexont/Vivitrol with opioid use reviewed.   Patient is interested in Vivitrol due to previous difficulty with compliance with Naltrexone.    Infusion orders placed. Patient will stop at infusion center.      Vulnerability to opioid overdose: Following a person s treatment with extended-release  injectable naltrexone, his or her opioid tolerance is reduced from pretreatment  baseline, and patients are vulnerable to potentially fatal overdose at the end of a  dosing interval, after missing a dose, or after discontinuing treatment. Attempts to  overcome the blockade may also lead to fatal overdose. This was communicated to the patient who understands this risk.     Encourage continued treatment and Encourage meeting attendance and sponsorship  Plan for [facility].  Vivitrol monthly.     (F40.10) Social anxiety disorder  Plan: Continue Celexa as rx.  Encourage mental health services    (F17.200) Nicotine use disorder  Plan: Encouraged Abstinence.  Increase risk of relapse with other substances with return to nicotine use discussed.  Risk of Ecig/Vaping also reviewed.                ENCOUNTER FOR LONG  TERM USE OF HIGH RISK MEDICATION   High Risk Drug Monitoring?  YES   Drug being monitored: vivitrol   Reason for drug: Alcohol Use Disorder   What is being monitored?: Dosage, Cravings, Triggers and side effects         Dana Hanna MD  [facility]

## 2017-06-26 NOTE — MR AVS SNAPSHOT
After Visit Summary   6/26/2017    Lance Perdomo    MRN: [id number]           Patient Information     Date Of Birth          1980        Visit Information        Provider Department      6/26/2017 1:20 PM Dana Hanna MD [facility]        Today's Diagnoses     Alcohol use disorder, severe, dependence (H)    -  1    Social anxiety disorder        Nicotine use disorder           Follow-ups after your visit        Your next 10 appointments already scheduled     Jun 27, 2017  8:00 AM CDT   Treatment with ADULT LODGING PLUS B   [facility] ([facility])    [address] [place] [address]   [phone]            Jun 28, 2017  8:00 AM CDT   Treatment with ADULT LODGING PLUS B   [facility] ([facility])    [address] [place] [address]   [phone]            Jun 28, 2017  8:30 AM CDT   Level O with UR BD 01   [facility] ([facility])    [address] [place] [address] S  [address] [place] [address]   [phone]            Jun 29, 2017  8:00 AM CDT   Treatment with ADULT LODGING PLUS B   [facility] ([facility])    [address] [place] [address]   [phone]            Jun 30, 2017  8:00 AM CDT   Treatment with ADULT LODGING PLUS B   [facility] ([facility])    [address] [place] [address]   [phone]            Jul 01, 2017  8:00 AM CDT   Treatment with ADULT LODGING PLUS B   [facility] ([facility])    [facility] [address] [address]   [phone]            Jul 02, 2017  8:00 AM CDT   Treatment  "with ADULT LODGING PLUS B   [facility] ([facility])    [address] [place] [id number] [phone]            Jul 24, 2017  1:40 PM CDT   Return Visit with Dana Hanna MD   [facility] ([facility])    [address]  [address]  [facility] [id number] [phone]              Future tests that were ordered for you today     Open Standing Orders        Priority Remaining Interval Expires Ordered    Urine Drugs of Abuse Screen Panel 13 Routine 12/12 6/26/2018 6/26/2017            Who to contact     If you have questions or need follow up information about today's clinic visit or your schedule please contact [facility] directly at [phone].  Normal or non-critical lab and imaging results will be communicated to you by MyChart, letter or phone within 4 business days after the clinic has received the results. If you do not hear from us within 7 days, please contact the clinic through MyChart or phone. If you have a critical or abnormal lab result, we will notify you by phone as soon as possible.  Submit refill requests through [employer] or call your pharmacy and they will forward the refill request to us. Please allow 3 business days for your refill to be completed.          Additional Information About Your Visit        SocStockhar[employer]ivano Information     [employer]wing lets you send messages to your doctor, view your test results, renew your prescriptions, schedule appointments and more. To sign up, go to [email or web address] . Click on \"Log in\" on the left side of the screen, which will take you to the Welcome page. Then click on \"Sign up Now\" on the right side of the page.     You will be asked to enter the access code listed below, as well as some personal information. Please follow the directions to create your username and password.     Your " "access code is: [email or web address]  Expires: 2017  8:17 AM     Your access code will  in 90 days. If you need help or a new code, please call your [facility] clinic or [phone].        Care EveryWhere ID     This is your Care EveryWhere ID. This could be used by other organizations to access your [facility] medical records  [id number]        Your Vitals Were     Pulse Temperature Respirations Height Pulse Oximetry BMI (Body Mass Index)    97 98.4  F (36.9  C) (Oral) 16 5' 10\" (1.778 m) 99% 25.18 kg/m2       Blood Pressure from Last 3 Encounters:   17 118/68   17 108/71   17 (!) 137/103    Weight from Last 3 Encounters:   17 175 lb 8 oz (79.6 kg)   17 175 lb (79.4 kg)   17 170 lb (77.1 kg)               Primary Care Provider Office Phone # Fax #    Crow Roberts [phone]       [facility] [address]  [facility] [address]        Equal Access to Services     PATSY BROWNING AH: Hadii yuli santizo hadasho Soomaali, waaxda luqadaha, qaybta kaalmada adeegyada, wendie naranjo. So [facility] [phone].    ATENCIÓN: Si habla español, tiene a syed disposición servicios gratuitos de asistencia lingüística. [facility] [phone].    We comply with applicable federal civil rights laws and [place] laws. We do not discriminate on the basis of race, color, national origin, age, disability sex, sexual orientation or gender identity.            Thank you!     Thank you for choosing [facility] PRIMARY [facility]  for your care. Our goal is always to provide you with excellent care. Hearing back from our patients is one way we can continue to improve our services. Please take a few minutes to complete the written survey that you may receive in the mail after your visit with us. Thank you!             Your Updated Medication List - Protect others around you: Learn how to safely use, store and throw away your medicines at " www.disposemymeds.org.          This list is accurate as of: 6/26/17  2:25 PM.  Always use your most recent med list.                   Brand Name Dispense Instructions for use Diagnosis    gabapentin 300 MG capsule    NEURONTIN    90 capsule    Take 1 capsule (300 mg) by mouth 3 times daily as needed    Social anxiety disorder       ibuprofen 600 MG tablet    ADVIL/MOTRIN    20 tablet    Take 1 tablet (600 mg) by mouth every 6 hours as needed for moderate pain        nicotine polacrilex 2 MG gum    CVS NICOTINE POLACRILEX    30 tablet    Place 1 each (2 mg) inside cheek as needed for smoking cessation    Social anxiety disorder       QUEtiapine 25 MG tablet    SEROQUEL    30 tablet    Take 1 tablet (25 mg) by mouth nightly as needed    Social anxiety disorder

## 2017-06-27 ENCOUNTER — HOSPITAL ENCOUNTER (OUTPATIENT)
Dept: BEHAVIORAL HEALTH | Facility: CLINIC | Age: 37
Discharge: HOME | End: 2017-06-27
Attending: PSYCHIATRY & NEUROLOGY | Admitting: PSYCHIATRY & NEUROLOGY
Payer: COMMERCIAL

## 2017-06-27 PROCEDURE — 10020000 ZZH LODGING PLUS FACILITY CHARGE ADULT

## 2017-06-27 PROCEDURE — H2035 A/D TX PROGRAM, PER HOUR: HCPCS | Mod: HQ

## 2017-06-27 RX ORDER — MAGNESIUM HYDROXIDE/ALUMINUM HYDROXICE/SIMETHICONE 120; 1200; 1200 MG/30ML; MG/30ML; MG/30ML
30 SUSPENSION ORAL EVERY 6 HOURS PRN
COMMUNITY
End: 2017-07-08

## 2017-06-27 RX ORDER — AMOXICILLIN 250 MG
2 CAPSULE ORAL DAILY PRN
COMMUNITY
End: 2017-07-08

## 2017-06-27 RX ORDER — LORATADINE 10 MG/1
10 TABLET ORAL DAILY PRN
COMMUNITY
End: 2017-07-08

## 2017-06-28 ENCOUNTER — HOSPITAL ENCOUNTER (OUTPATIENT)
Dept: BEHAVIORAL HEALTH | Facility: CLINIC | Age: 37
End: 2017-06-28
Attending: PSYCHIATRY & NEUROLOGY
Payer: COMMERCIAL

## 2017-06-28 ENCOUNTER — INFUSION THERAPY VISIT (OUTPATIENT)
Dept: INFUSION THERAPY | Facility: CLINIC | Age: 37
End: 2017-06-28
Attending: PEDIATRICS
Payer: COMMERCIAL

## 2017-06-28 VITALS
TEMPERATURE: 98.4 F | HEART RATE: 89 BPM | RESPIRATION RATE: 20 BRPM | SYSTOLIC BLOOD PRESSURE: 116 MMHG | DIASTOLIC BLOOD PRESSURE: 78 MMHG

## 2017-06-28 DIAGNOSIS — F10.20 ALCOHOL USE DISORDER, SEVERE, DEPENDENCE (H): Primary | ICD-10-CM

## 2017-06-28 PROCEDURE — 96372 THER/PROPH/DIAG INJ SC/IM: CPT

## 2017-06-28 PROCEDURE — 25000128 H RX IP 250 OP 636: Performed by: PEDIATRICS

## 2017-06-28 PROCEDURE — H2035 A/D TX PROGRAM, PER HOUR: HCPCS

## 2017-06-28 PROCEDURE — H2035 A/D TX PROGRAM, PER HOUR: HCPCS | Mod: HQ

## 2017-06-28 PROCEDURE — 10020000 ZZH LODGING PLUS FACILITY CHARGE ADULT

## 2017-06-28 RX ADMIN — NALTREXONE 380 MG: KIT at 09:12

## 2017-06-28 NOTE — MR AVS SNAPSHOT
After Visit Summary   6/28/2017    Lance Perdomo    MRN: [id number]           Patient Information     Date Of Birth          1980        Visit Information        Provider Department      6/28/2017 8:30 AM [facility] BD 01 [facility]        Today's Diagnoses     Alcohol use disorder, severe, dependence (H)    -  1       Follow-ups after your visit        Your next 10 appointments already scheduled     Jun 29, 2017  8:00 AM CDT   Treatment with ADULT LODGING PLUS B   [facility] ([facility])    [facility] [address] [address]   [phone]            Jun 30, 2017  8:00 AM CDT   Treatment with ADULT LODGING PLUS B   [facility] ([facility])    [address] [place] [address]   [phone]            Jul 01, 2017  8:00 AM CDT   Treatment with ADULT LODGING PLUS B   [facility] ([facility])    [address] [place] [address]   [phone]            Jul 02, 2017  8:00 AM CDT   Treatment with ADULT LODGING PLUS B   [facility] ([facility])    [address] [place] [address]   [phone]            Jul 24, 2017  1:40 PM CDT   Return Visit with Dana Hanna MD   [facility] ([facility])    [address] [place] [address]  [address]  [facility] [address]   [phone]            Jul 24, 2017  2:30 PM CDT   Level O with UR CH 04   [facility])    [address] [place] [address] [address].  [address]  [facility] [address]   [phone]              Who to contact     If you have questions or need follow up information about today's clinic visit or your  "schedule please contact [facility], INFUSION SERVICES directly at [phone].  Normal or non-critical lab and imaging results will be communicated to you by MyChart, letter or phone within 4 business days after the clinic has received the results. If you do not hear from us within 7 days, please contact the clinic through Medroboticshart or phone. If you have a critical or abnormal lab result, we will notify you by phone as soon as possible.  Submit refill requests through [employer] or call your pharmacy and they will forward the refill request to us. Please allow 3 business days for your refill to be completed.          Additional Information About Your Visit        MedroboticsharAdayana Information     [employer] lets you send messages to your doctor, view your test results, renew your prescriptions, schedule appointments and more. To sign up, go to [email or web address] . Click on \"Log in\" on the left side of the screen, which will take you to the Welcome page. Then click on \"Sign up Now\" on the right side of the page.     You will be asked to enter the access code listed below, as well as some personal information. Please follow the directions to create your username and password.     Your access code is: [email or web address]  Expires: 2017  8:17 AM     Your access code will  in 90 days. If you need help or a new code, please call your [place] clinic or [phone].        Care EveryWhere ID     This is your Care EveryWhere ID. This could be used by other organizations to access your [place] medical records  [id number]        Your Vitals Were     Pulse Temperature Respirations             89 98.4  F (36.9  C) (Oral) 20          Blood Pressure from Last 3 Encounters:   17 116/78   17 118/68   17 108/71    Weight from Last 3 Encounters:   17 79.6 kg (175 lb 8 oz)   17 79.4 kg (175 lb)   17 77.1 kg (170 lb)              Today, you had the following     No orders found for display       " Primary Care Provider Office Phone # Fax #    Crowsamaria Roberts [phone]       [facility] [address]  [facility] [address]        Equal Access to Services     CASS BROWNING : Hadii aad ku hadasho Soomaali, waaxda luqadaha, qaybta kaalmada adeegyada, waxmarielena kayin kimn trino singleton laJairokellie naranjo. So [facility] [phone].    ATENCIÓN: Si habla español, tiene a syed disposición servicios gratuitos de asistencia lingüística. Llame al [phone].    We comply with applicable federal civil rights laws and [place] laws. We do not discriminate on the basis of race, color, national origin, age, disability sex, sexual orientation or gender identity.            Thank you!     Thank you for choosing [facility]  for your care. Our goal is always to provide you with excellent care. Hearing back from our patients is one way we can continue to improve our services. Please take a few minutes to complete the written survey that you may receive in the mail after your visit with us. Thank you!             Your Updated Medication List - Protect others around you: Learn how to safely use, store and throw away your medicines at www.disposemymeds.org.          This list is accurate as of: 6/28/17  9:28 AM.  Always use your most recent med list.                   Brand Name Dispense Instructions for use Diagnosis    alum & mag hydroxide-simethicone 200-200-20 MG/5ML Susp suspension    MYLANTA/MAALOX     Take 30 mLs by mouth every 6 hours as needed for indigestion        CITALOPRAM HYDROBROMIDE PO      Take 20 mg by mouth Take one and one half tablets (30 mg) by mouth every day        gabapentin 300 MG capsule    NEURONTIN    90 capsule    Take 1 capsule (300 mg) by mouth 3 times daily as needed    Social anxiety disorder       guaiFENesin 20 mg/mL Soln solution    ROBITUSSIN     Take 10 mLs by mouth every 4 hours as needed for cough        ibuprofen 600 MG tablet    ADVIL/MOTRIN    20  tablet    Take 1 tablet (600 mg) by mouth every 6 hours as needed for moderate pain        loratadine 10 MG tablet    CLARITIN     Take 10 mg by mouth daily as needed for allergies        MELATONIN PO      Take 3 mg by mouth nightly as needed        nicotine polacrilex 2 MG gum    CVS NICOTINE POLACRILEX    30 tablet    Place 1 each (2 mg) inside cheek as needed for smoking cessation    Social anxiety disorder       phenol-menthol 14.5 MG lozenge      Place 1 lozenge inside cheek every 2 hours as needed for moderate pain        QUEtiapine 25 MG tablet    SEROQUEL    30 tablet    Take 1 tablet (25 mg) by mouth nightly as needed    Social anxiety disorder       senna-docusate 8.6-50 MG per tablet    SENOKOT-S;PERICOLACE     Take 2 tablets by mouth daily as needed for constipation        TYLENOL PO      Take 650 mg by mouth every 4 hours as needed for mild pain or fever

## 2017-06-28 NOTE — PROGRESS NOTES
Here for 1st injection. Is in treatment. Given pamphlet, ID card, and stretch bracelet. Tolerated injection. Stayed for short  observation after injection. No delayed reaction noted. Let ambulatory when discharged. To return at next appointment.

## 2017-06-29 ENCOUNTER — HOSPITAL ENCOUNTER (OUTPATIENT)
Dept: BEHAVIORAL HEALTH | Facility: CLINIC | Age: 37
End: 2017-06-29
Attending: PSYCHIATRY & NEUROLOGY
Payer: COMMERCIAL

## 2017-06-29 PROCEDURE — H2035 A/D TX PROGRAM, PER HOUR: HCPCS | Mod: HQ

## 2017-06-29 PROCEDURE — 10020000 ZZH LODGING PLUS FACILITY CHARGE ADULT

## 2017-06-29 PROCEDURE — 25000125 ZZHC RX 250

## 2017-06-29 NOTE — PROGRESS NOTES
"INDIVIDUAL SESSION SUMMARY    D) Met with client on 6/29/17 from 8:30-9:20. Client reported feeling \"frustrated\" and spoke about missing his LP peers that have graduated from the program, struggling to find having difficulty with some new LP patients' behaviors, groups being too long, lack of exercise opportunities and not being able to participate in the two research studies. Client spoke about stressors related to living with his parents including his dad retiring and linear way of thinking and his mom's hearing problems. Client stated that his meds are \"helping\" and he is \"sleeping OK\". For self care client reported that he has been working on his homework, making attempts to connect with LP peers, playing volleyball, gratitude list at bedtime and reading. Therapist redirected client several times to focus on what he has control over and not just his frustrations. Client spoke about his hope to find work while at [employer] for treatment.   I) Individual session with client. Provided client with verbal interventions including: validation and redirection to focus on what he can control.   A) Client appears stuck in an old pattern of focusing on the negative and not on what he has control over. Client appears to be using time with his peers for stress management and recognizing the importance of creating a sober support network. Client appears to struggle with finding meaningful activities for his down time.    P) Next session is scheduled for 7/6/17.   Ellie Mercado, NEELA  6/29/2017    "

## 2017-06-29 NOTE — PROGRESS NOTES
This writer received report that Pt would be discharging to [facility] administered 6/29 at 1530 by this writer.  Pt understands he will have read in 48-72 hours by RN staff

## 2017-06-30 ENCOUNTER — HOSPITAL ENCOUNTER (OUTPATIENT)
Dept: BEHAVIORAL HEALTH | Facility: CLINIC | Age: 37
End: 2017-06-30
Attending: PSYCHIATRY & NEUROLOGY
Payer: COMMERCIAL

## 2017-06-30 PROCEDURE — 10020000 ZZH LODGING PLUS FACILITY CHARGE ADULT

## 2017-06-30 PROCEDURE — H2035 A/D TX PROGRAM, PER HOUR: HCPCS | Mod: HQ

## 2017-07-01 ENCOUNTER — HOSPITAL ENCOUNTER (OUTPATIENT)
Dept: BEHAVIORAL HEALTH | Facility: CLINIC | Age: 37
End: 2017-07-01
Attending: PSYCHIATRY & NEUROLOGY
Payer: COMMERCIAL

## 2017-07-01 PROCEDURE — H2035 A/D TX PROGRAM, PER HOUR: HCPCS | Mod: HQ

## 2017-07-01 PROCEDURE — 10020000 ZZH LODGING PLUS FACILITY CHARGE ADULT

## 2017-07-02 ENCOUNTER — HOSPITAL ENCOUNTER (OUTPATIENT)
Dept: BEHAVIORAL HEALTH | Facility: CLINIC | Age: 37
End: 2017-07-02
Attending: PSYCHIATRY & NEUROLOGY
Payer: COMMERCIAL

## 2017-07-02 PROCEDURE — 10020000 ZZH LODGING PLUS FACILITY CHARGE ADULT

## 2017-07-02 PROCEDURE — H2035 A/D TX PROGRAM, PER HOUR: HCPCS | Mod: HQ

## 2017-07-03 ENCOUNTER — HOSPITAL ENCOUNTER (OUTPATIENT)
Dept: BEHAVIORAL HEALTH | Facility: CLINIC | Age: 37
End: 2017-07-03
Attending: FAMILY MEDICINE
Payer: COMMERCIAL

## 2017-07-03 PROCEDURE — H2035 A/D TX PROGRAM, PER HOUR: HCPCS | Mod: HQ

## 2017-07-03 PROCEDURE — 10020000 ZZH LODGING PLUS FACILITY CHARGE ADULT

## 2017-07-03 NOTE — PROGRESS NOTES
"P-Patient completed his \"Self Sabotage and Self Defeating Behaviors\" assignment and presenting in group. Patient discussed how on fixating on negative thoughts and his worse qualities as an addict caused him to have a belief system that told him he wasn't worth very much. Patient discussed how he sabotaged relationships in his life both with friends and family. Patient also discussed how the clarity he has achieved while in treatment has caused him to better assess his good qualities and will hopefully cause him to value his life by accepting and forgiving himself as he moves forward.  I-Group therapy, Counselor reviewed group norms, facilitated group discussion and conducted feedback session with peers.  A-Patient appears to have a much better understanding of why he sabotages his success and better ways to think and behave which will allow to have better outcomes.  P-Continue treatment and treatment plan objectives.    "

## 2017-07-04 ENCOUNTER — HOSPITAL ENCOUNTER (OUTPATIENT)
Dept: BEHAVIORAL HEALTH | Facility: CLINIC | Age: 37
End: 2017-07-04
Attending: FAMILY MEDICINE
Payer: COMMERCIAL

## 2017-07-04 PROCEDURE — 10020000 ZZH LODGING PLUS FACILITY CHARGE ADULT

## 2017-07-04 PROCEDURE — H2035 A/D TX PROGRAM, PER HOUR: HCPCS | Mod: HQ

## 2017-07-04 NOTE — PROGRESS NOTES
Patient:  Lance Perdomo            Adult CD Progress Note and Treatment Plan Review     Attendance  Please refer to OP BEH CD Adult Attendance Record Documentation Flowsheet    Support group attended this week: yes    Reporting sobriety:  yes    Treatment Plan     Treatment Plan Review competed on: 7/4/17       Client preferred learning style: Visual  Hands on  Verbal  Demonstration    Staff Members contributing: Shamar Hector [facility]; Ganesh Moreau, [facility]; and NOLAN Davis.                         Received Supervision: No    Client: contributed to goals and plan.    Client received copy of plan/revised plan: Yes    Client agrees with plan/revised plan: Yes    Changes to Treatment Plan: No    New Goals added since last review: None    Goals worked on since last review: self-sabotage and self-defeating behaviors, relationship skills, mental health management, letter to father, spiritual care, gaining resiliency     Strategies effective: yes    Strategies need these changes: Continue with above goals.     ASAM Risk Rating:    Dimension 1 0 No problems. OTIS of alcohol: 6/9/17.    Dimension 2 0 Pt continues to follow recommendations of his physician.    Dimension 3 2 Pt reports a diagnosis of depression and anxiety. Pt saw the staff psychiatrist and has been following recommendations, including being medication compliant. Pt has also been seeing the staff psychotherapist on a weekly basis for 1:1 therapy. Pt denies any suicidal ideations this week and his mood appears stable.     Dimension 4 2 Pt continues to work on increasing his internal motivation for long-term sobriety. Pt also continues to work on identifying the negative consequences of his chemical use. Pt struggles with some entitlement related to his recovery. Pt attended and participated in the spiritual care group facilitated by Ines Bosch and supervised by NOLAN Davis.    Dimension 5 4 Pt has a hx of being in tx ten previous times.  "Pt is working on his relapse assignments and reading in the booklet \"Mistaken Beliefs About Relapse.\" Pt has struggled to accept life on life's terms. Pt read the pamphlets \"Taking Responsibility\" and \"Emotional Maturity.\" He then wrote and shared in group two pages on the insights he gained and how he can apply the principles of the materials to his recovery and to be more accepting in life. Pt also identified areas of self-sabotage in his life and how he can change that by completing and presenting in group the assignment \"Self-Sabotage and Self-Defeating Behaviors.\"      Dimension 6 4 Pt's housing has not been supportive of recovery in the past. Pt has been working with the staff  and has been set up for [facility] extended care program. Pt will transfer there directly from [facility]. Pt lacks a sober support network. Pt has been attending multiple 12-step meetings per week while in  and has been spending time connecting with his fellow group members and tx peers. Pt is on probation for a DWI in 2015. Pt has been abiding by conditions of his probation while in  and has been remaining in contact with his PO.     Any changes in Vulnerable Adult Status?  No  If yes, add to treatment plan and individual abuse prevention plan.    Family Involvement:   KATIE signed    Data:   offered feedback good insight client did actively participate    Intervention:   Aftercare planning  Behavior modification  Cognitive Behavioral Therapy  Counselor feedback  Education  Emotional management  Group feedback    Assessment:   Stages of Change Model  Preparation/Determination    Appears/Sounds:  Cooperative  Motivated  Engaged    Plan:  Next treatment task referral out  Focus on recovery environment  Monitor emotional/physical health      MARIBELL Diaz        "

## 2017-07-05 ENCOUNTER — HOSPITAL ENCOUNTER (OUTPATIENT)
Dept: BEHAVIORAL HEALTH | Facility: CLINIC | Age: 37
End: 2017-07-05
Attending: FAMILY MEDICINE
Payer: COMMERCIAL

## 2017-07-05 PROCEDURE — 10020000 ZZH LODGING PLUS FACILITY CHARGE ADULT

## 2017-07-05 PROCEDURE — H2035 A/D TX PROGRAM, PER HOUR: HCPCS | Mod: HQ

## 2017-07-06 ENCOUNTER — HOSPITAL ENCOUNTER (OUTPATIENT)
Dept: BEHAVIORAL HEALTH | Facility: CLINIC | Age: 37
End: 2017-07-06
Attending: FAMILY MEDICINE
Payer: COMMERCIAL

## 2017-07-06 PROCEDURE — H2035 A/D TX PROGRAM, PER HOUR: HCPCS

## 2017-07-06 PROCEDURE — H2035 A/D TX PROGRAM, PER HOUR: HCPCS | Mod: HQ

## 2017-07-06 PROCEDURE — 10020000 ZZH LODGING PLUS FACILITY CHARGE ADULT

## 2017-07-06 NOTE — PROGRESS NOTES
"INDIVIDUAL SESSION SUMMARY    D) Met with client on 7/6/17 from 1:30-2:00. Client reported feeling more positive than the last session and spoke to how he's trying hard to not be \"pesimmistic like before\" and \"train his brain to be more positive\".  Client spoke about moving to [place] and the strain that placed on his family to move him out of his apartment this week. Client spoke about hoping to find work in while at [facility]. Client stated that he got the therapist referrals from this therapist and also planned to check in with his old therapist, Willie Mota. Client reported that he has re-engaged with LP peers around games/a tournament and that he is feeling \"ready to go\".   I) Individual session with client. Provided client with verbal interventions including: validation, nurturing, support, redirection, and healthy boundary setting. Spent time in session practicing calm breath exercises.  A)Client appears to have more awareness of his tendency to get stuck in an old pattern of focusing on the negative. Client appears to be taking steps to focus on what he has control over. Client appears to be using time with his peers for stress management and recognizing the importance of creating a sober support network. Client appears to be seeking out ways to connect with others in his down time.  P) No future sessions scheduled. Therapist recommended client read the book Harwiring Happiness.   Ellie Mercado, LMFT  7/6/2017    "

## 2017-07-07 ENCOUNTER — HOSPITAL ENCOUNTER (OUTPATIENT)
Dept: BEHAVIORAL HEALTH | Facility: CLINIC | Age: 37
End: 2017-07-07
Attending: FAMILY MEDICINE
Payer: COMMERCIAL

## 2017-07-07 PROCEDURE — H2035 A/D TX PROGRAM, PER HOUR: HCPCS | Mod: HQ

## 2017-07-07 PROCEDURE — 10020000 ZZH LODGING PLUS FACILITY CHARGE ADULT

## 2017-07-08 ENCOUNTER — HOSPITAL ENCOUNTER (OUTPATIENT)
Dept: BEHAVIORAL HEALTH | Facility: CLINIC | Age: 37
End: 2017-07-08
Attending: FAMILY MEDICINE
Payer: COMMERCIAL

## 2017-07-08 PROCEDURE — H2035 A/D TX PROGRAM, PER HOUR: HCPCS | Mod: HQ

## 2017-07-08 PROCEDURE — 10020000 ZZH LODGING PLUS FACILITY CHARGE ADULT

## 2017-07-08 NOTE — IP AVS SNAPSHOT
Medication List       Patient:  BESSY AVILA   :  1980   Physician:  Crow Roberts           This is your record.  Keep this with you and show to your community pharmacist(s) and physician(s) at each visit.     Allergies:  AMOXICILLIN - Nausea and Vomiting     AUGMENTIN - Nausea and Vomiting               Medications  Valid as of: 2017 - 10:48 AM    Generic Name Brand Name Tablet Size Instructions for use    Citalopram Hydrobromide   Take 20 mg by mouth Take one and one half tablets (30 mg) by mouth every day        Gabapentin NEURONTIN 300 MG Take 1 capsule (300 mg) by mouth 3 times daily as needed        Ibuprofen ADVIL/MOTRIN 600 MG Take 1 tablet (600 mg) by mouth every 6 hours as needed for moderate pain        Nicotine Polacrilex NICORETTE 2 MG Place 1 each (2 mg) inside cheek as needed for smoking cessation        QUEtiapine Fumarate SEROquel 25 MG Take 1 tablet (25 mg) by mouth nightly as needed        .           .           .           .

## 2017-07-08 NOTE — PROGRESS NOTES
MICD Discharge Summary/Instructions    Patient:  Lance Perdomo    MRN: [id number]  : 1980 Age: 36 year old Sex: male   -   Focus of Treatment / Discharge Recommendations   Personal Safety/ Management of Symptoms   * Follow your safety plan. Report increased symptoms to your care team and /or go to the nearest Emergency Department.   * Call crisis lines as needed   [facility] [phone] [facility] [phone]   [facility] [phone] Crisis Connection [phone]   [facility] [phone] [facility] [phone]   [facility] [phone] National Suicide Prevention [phone]   [facility] [phone] Suicide Prevention [phone]   [facility] [phone]   Abstinence/Relapse Prevention   * Take all medicines as directed. Carry a current list of medicines with you.   * Use coping skills: Use peer support group,continue to attend 12 step meetings.Obtain and work with sponsor.   * Do not use illicit (street) drugs, controlled substances (narcotics) or alcohol.   Develop/Improve Independent Living/Socialization Skills: Continue to build relationship with family and sober support network.  Community Resources/Supports and Discharge Planning: Attend three 12 step meetings per week.Obtain sponsor.Attend Phase 2 on Thurs at 5:30pm   Follow up with psychiatrist / main caregiver: BREANA Next visit: TBD   Follow up with your therapist: N/A Next visit: N/A   Go to group therapy and / or support groups at: Phase 2 and 12 step meetings by your home.   See your medical doctor about: N/A   Other:   Client Signature:_______________________ Date / Time:___________   Staff Signature:________________________ Date / Time:___________

## 2017-07-08 NOTE — PROGRESS NOTES
Nursing Discharge Planning Meeting    Writer completed discharge planning meeting with patient. Discharge is planned for 7/10/2017.    Discussed appropriate follow up care to manage HOMA and to obtain medication refills. Patient given a copy of their current medications for reference. Questions answered and patient verbalized understanding of post-discharge follow up plan.  Patient has appt scheduled with Dr. hart for July 24 at 1:40 pm and Infusion clinic for Vivitrol at 2:30 pm.    Continue to support patient in discharge planning as needed to assure appropriate continuity of care.     Essential Oil Therapy  Patient used essential oil therapy during treatment program: No   If yes, was the essential oil therapy effective for managing sleep, pain, anxiety, or mood? NA    Was using the essential oil therapy a trigger for any cravings or urges to use drugs or alcohol? NA

## 2017-07-08 NOTE — IP AVS SNAPSHOT
"                  MRN:[id number]                      After Visit Summary   2017    Lance Perodmo    MRN: [id number]           Visit Information        Provider Department      2017  8:00 AM ADULT LODGING PLUS B [facility]        Your next 10 appointments already scheduled     2017  8:00 AM CDT   Treatment with ADULT LODGING PLUS B   [facility] ([facility])    [address] [address]   [phone]            2017  1:40 PM CDT   Return Visit with Dana Hanna MD   [facility] ([facility])    [address] [place] [address]  [address]  [facility] [address]   [phone]            2017  2:30 PM CDT   Level O with UR CH 04   [facility] ([facility])    [address] [place] [address] S.  [address]  [facility] [address]   [phone]              MyChart Information     [employer] lets you send messages to your doctor, view your test results, renew your prescriptions, schedule appointments and more. To sign up, go to [email or web address] . Click on \"Log in\" on the left side of the screen, which will take you to the Welcome page. Then click on \"Sign up Now\" on the right side of the page.     You will be asked to enter the access code listed below, as well as some personal information. Please follow the directions to create your username and password.     Your access code is: [email or web address]  Expires: 2017  8:17 AM     Your access code will  in 90 days. If you need help or a new code, please call your [facility] clinic or [phone].        Care EveryWhere ID     This is your Care EveryWhere ID. This could be used by other organizations to access your [facility] medical records  [id number]        Equal Access to Services     CASS BROWNING AH: Sakina green " Sharmila, marlon mann, kamala kaalmajuan ramon de la fuente, wendie naranjo. So [facility] [phone].    ATENCIÓN: Si habla español, tiene a syed disposición servicios gratuitos de asistencia lingüística. Llame al [phone].    We comply with applicable federal civil rights laws and [place] laws. We do not discriminate on the basis of race, color, national origin, age, disability sex, sexual orientation or gender identity.

## 2017-07-09 ENCOUNTER — HOSPITAL ENCOUNTER (OUTPATIENT)
Dept: BEHAVIORAL HEALTH | Facility: CLINIC | Age: 37
End: 2017-07-09
Attending: FAMILY MEDICINE
Payer: COMMERCIAL

## 2017-07-09 PROCEDURE — 10020000 ZZH LODGING PLUS FACILITY CHARGE ADULT

## 2017-07-09 PROCEDURE — H2035 A/D TX PROGRAM, PER HOUR: HCPCS | Mod: HQ

## 2017-07-09 NOTE — PROGRESS NOTES
7/9/2017    Patient did not attend morning lecture/ workshop. This writer visited his room during lecture. Patient stated that he would not be attending due to feeling sick.    MARIBELL Sanders

## 2017-07-12 NOTE — PROGRESS NOTES
CHEMICAL DEPENDENCY DISCHARGE SUMMARY    EVALUATION COUNSELOR:  Teresa Leal MA, [facility]   TREATMENT COUNSELORS:  Shamar Olson,[facility] Ganesh Contreras,[facility]  REFERRAL SOURCE:  SELF.   PROGRAM:  [facility] Program.  ADMISSION DATE:  06/12/2017.  LAST SESSION DATE:  07/09/2017.   ADMISSION DIAGNOSIS:    1.  Alcohol use disorder severe, F10.20.  DISCHARGE DIAGNOSIS:  1.  Alcohol use disorder, severe, F10.20.  DISCHARGE STATUS:  The patient successfully completed program with staff approval.    LAST USE DATE:  As client reported 06/09/2017.  DAYS OF TREATMENT COMPLETED:  [facility]ging Plus 28 days.     PRESENTING INFORMATION:  The patient had scheduled chemical dependency evaluation and assessment at the [facility] seeking treatment for addiction.  The patient had been to treatment numerous times.  The patient was requesting treatment in the [facility] at [place].  The patient met the criteria.  The patient was admitted to the [facility] Chemical Dependency Treatment Program.    SERVICES PROVIDED:  Services included assessment, treatment planning, education regarding chemical dependency, individual group and family therapies, spiritual care counseling and workshops dealing with the issues of depression, anxiety, relapse, prevention and relationships.      ISSUES ADDRESSED IN TREATMENT:  DIMENSION 1:  Acute Intoxication and Withdrawal Potential:  Admission risk factor 0.  Discharge risk factor 0.  Patient had no issues in this area.      DIMENSION 2:  Biomedical Conditions and Complications:  Admission risk factor 1.  Discharge risk factor 1.  The patient needs to follow all recommendations of his medical doctor for symptoms of past concussion.  The patient reported no symptoms at all during the treatment program.      DIMENSION 3:  Emotional/Behavioral/Cognitive:  Admission risk factor 2.  Discharge risk factor  0.  Upon entering treatment the patient participated in a suicide risk screening and was rated as being a low or no risk.  The patient completed a safety plan and signed this plan.  At no time during the treatment program did the patient have any suicidal ideation.  Upon entering treatment, the patient reported a past history of depression and anxiety.  The patient met with the staff psychiatrist.  The patient stayed medication compliant throughout the treatment program.  The patient met with the staff psychotherapist.  The patient reported that he has ongoing resentments toward his father.  The patient was asked to write a dear dad letter and share it in group.  The patient did an outstanding job on this assignment.  The patient received positive feedback from his peers.      DIMENSION 4:  Readiness to Change:  Admission risk factor 2.  Discharge risk factor 0.  Upon entering treatment, the patient had struggled with surrender and acceptance of the first step.  The patient was asked to complete and share in group his first step packet.  The patient did this and received positive feedback from his peers.  The patient was asked to read the packet, surrender to win.  The patient wrote a 2-page reflection paper on what he gained by this assignment.  The patient did an outstanding job on this assignment.  This allowed the patient to increase his acceptance of the first step and to surrender to his disease.  The patient needed to increase his internal motivation to change his lifestyle.  The patient needed to take ownership of the negative consequences of his drug use.  The patient was asked to prepare and present his drug use history, consequences of his use and the values that he has violated.  This assignment allowed the patient to increase his internal motivation to change.      DIMENSION 5:  Relapse, Continued Use, Continued Problem Potential:  Admission risk factor 4.  Discharge risk factor 3.  Upon entering  "treatment, the patient had a history of being in treatment at least 10+ times.  The patient was asked to read Mistaken Belief about relapse.  The patient was identify his mistaken believes and share a 2-page reflection paper on what he learned from this reading with his group members.  The patient was asked to complete and share in group putting it all together.  The patient did an outstanding job on this assignment.  The patient was asked to complete the packet on willingness.  The patient completed this assignment.  The patient lacked emotional maturity.  The patient stated that he needs to grow up.  The patient was given the assignment to read emotional maturity and taking responsibility.  The patient wrote 2 pages on his reflections after reading these.  The patient has a history of self-sabotage especially in early recovery.  The patient was given the assignment \"self-sabotage and self-defeating behaviors.\"  The patient did an outstanding job on this assignment and shared it in group.      DIMENSION 6:  Recovery Environment:  Admission risk factor 4.  Discharge risk factor 3.  Upon entering treatment, the patient lacked a sober living situation.  The patient interviewed and was accepted at [facility] treatment program.  This was an outstanding choice for the patient.  The patient lacked a sober support network.  The patient attended five 12 step meetings per week while in treatment.  The patient was able to obtain a temporary sponsor through the [employer] alumni office.  The patient had damaged the relationship with his family.  The patient invited his family to participate in the family week program.      STRENGTHS:  The patient exhibited a positive and open attitude throughout the treatment process.  The patient demonstrated consistent support toward his peers.  The patient gained valuable insight into chemical dependency.      PROGNOSIS:  Favorable.     LIVING ARRANGEMENTS AT DISCHARGE:  The patient will be " living at [facility].    CONTINUING CARE RECOMMENDATIONS AND REFERRALS:    1.  The patient needs to abstain from all mood-altering chemicals.    2.  The patient needs to attend a minimum of three 12-step meetings per week.  3.  The patient needs to build an open and honest relationship with his sponsor.  4.  The patient must complete the [facility] treatment program.   5.  The patient needs to stay medication compliant.         JUSTIN Reyes, [facility]    D:  07/10/2017 14:42 T:  07/11/2017 19:19  Document:  [id number] RB\AD

## 2017-07-24 ENCOUNTER — OFFICE VISIT (OUTPATIENT)
Dept: ADDICTION MEDICINE | Facility: CLINIC | Age: 37
End: 2017-07-24
Payer: COMMERCIAL

## 2017-07-24 ENCOUNTER — INFUSION THERAPY VISIT (OUTPATIENT)
Dept: INFUSION THERAPY | Facility: CLINIC | Age: 37
End: 2017-07-24
Attending: PEDIATRICS
Payer: COMMERCIAL

## 2017-07-24 VITALS
WEIGHT: 180.5 LBS | DIASTOLIC BLOOD PRESSURE: 78 MMHG | SYSTOLIC BLOOD PRESSURE: 120 MMHG | RESPIRATION RATE: 18 BRPM | BODY MASS INDEX: 25.9 KG/M2 | HEART RATE: 98 BPM | OXYGEN SATURATION: 99 % | TEMPERATURE: 98.7 F

## 2017-07-24 VITALS
OXYGEN SATURATION: 97 % | SYSTOLIC BLOOD PRESSURE: 121 MMHG | RESPIRATION RATE: 16 BRPM | TEMPERATURE: 97.9 F | HEART RATE: 70 BPM | DIASTOLIC BLOOD PRESSURE: 67 MMHG

## 2017-07-24 DIAGNOSIS — F10.20 ALCOHOL USE DISORDER, SEVERE, DEPENDENCE (H): Primary | ICD-10-CM

## 2017-07-24 DIAGNOSIS — F10.20 ALCOHOL USE DISORDER, SEVERE, DEPENDENCE (H): ICD-10-CM

## 2017-07-24 LAB
AMPHETAMINES UR QL: NORMAL NG/ML
BARBITURATES UR QL SCN: NORMAL NG/ML
BENZODIAZ UR QL SCN: NORMAL NG/ML
BUPRENORPHINE UR QL: NORMAL NG/ML
CANNABINOIDS UR QL: NORMAL NG/ML
COCAINE UR QL SCN: NORMAL NG/ML
D-METHAMPHET UR QL: NORMAL NG/ML
METHADONE UR QL SCN: NORMAL NG/ML
OPIATES UR QL SCN: NORMAL NG/ML
OXYCODONE UR QL SCN: NORMAL NG/ML
PCP UR QL SCN: NORMAL NG/ML
PROPOXYPH UR QL: NORMAL NG/ML
TRICYCLICS UR QL SCN: NORMAL NG/ML

## 2017-07-24 PROCEDURE — 25000128 H RX IP 250 OP 636: Performed by: PEDIATRICS

## 2017-07-24 PROCEDURE — 99214 OFFICE O/P EST MOD 30 MIN: CPT | Performed by: PEDIATRICS

## 2017-07-24 PROCEDURE — 80306 DRUG TEST PRSMV INSTRMNT: CPT | Performed by: PEDIATRICS

## 2017-07-24 PROCEDURE — 96372 THER/PROPH/DIAG INJ SC/IM: CPT

## 2017-07-24 RX ADMIN — NALTREXONE 380 MG: KIT at 14:33

## 2017-07-24 ASSESSMENT — PAIN SCALES - GENERAL: PAINLEVEL: NO PAIN (0)

## 2017-07-24 NOTE — PROGRESS NOTES
SUBJECTIVE:                                                      ALCOHOL USE DISORDER - VIVITROL MAINTENANCE FOLLOW UP:    Lance Perdomo is a 36 year old male who presents to clinic today follow up of Vivitrol injections for Alcohol use disorder    HPI:  At [facility] currently.    Will be working soon.  Did receive one injection.  Found it very helpful for craving and would like to continue.  Working on insurance issues. Scheduled for next injection today.           Status since last visit:    Since last visit patient has been: stable.     Intensity:     There has been: no craving.      Progression of Symptoms:     Cues to use and relapse triggers:     Recovery program has been: solid.   Accompanying Signs & Symptoms:    Side Effects: none.    Sobriety:     Status: no use since last visit.      Drug Screen: obtained.   Precipitating factors:    Triggers have been: mild.   Alleviating factors:    Contact with sponsor has been: regular.     Family and support system has been: helpful.   Other Therapies Tried :     Patient has been going to recovery meetings:regularly.      [place] Board of Pharmacy Data Base Reviewed:    YES;     Problem list and histories reviewed & adjusted, as indicated.  Additional history: as documented    Patient Active Problem List    Diagnosis Date Noted     Alcohol use disorder, severe, dependence (H) 06/26/2017     Priority: Medium     Social anxiety disorder 06/26/2017     Priority: Medium     Nicotine use disorder 06/26/2017     Priority: Medium       Past Medical History:   Diagnosis Date     Alcohol abuse, in remission 12/9/2014     Anxiety 12/9/2014     Depression        Past Surgical History:   Procedure Laterality Date     NO HISTORY OF SURGERY           Current Outpatient Prescriptions on File Prior to Visit:  CITALOPRAM HYDROBROMIDE PO Take 20 mg by mouth Take one and one half tablets (30 mg) by mouth every day   ibuprofen (ADVIL/MOTRIN) 600 MG tablet Take 1 tablet (600  mg) by mouth every 6 hours as needed for moderate pain   QUEtiapine (SEROQUEL) 25 MG tablet Take 1 tablet (25 mg) by mouth nightly as needed   gabapentin (NEURONTIN) 300 MG capsule Take 1 capsule (300 mg) by mouth 3 times daily as needed   nicotine polacrilex ([employer] NICOTINE POLACRILEX) 2 MG gum Place 1 each (2 mg) inside cheek as needed for smoking cessation     Current Facility-Administered Medications on File Prior to Visit:  Self Administer Medications: Behavioral Services          Allergies   Allergen Reactions     Amoxicillin Nausea and Vomiting     vomiting     Augmentin Nausea and Vomiting     vomiting         REVIEW OF SYSTEMS:    General: No acute withdrawal symptoms.  No recent infections or fever  Eyes: Negative for vision changes or eye problems  Resp: No coughing, wheezing or shortness of breath  CV: No chest pains or palpitations  GI: No nausea, vomiting, abdominal pain, diarrhea, constipation  : No urinary frequency or dysuria,     Musculoskeletal: No significant muscle or joint pains, No edema  Neurologic: No numbness, tingling, weakness, problems with balance or coordination  Psychiatric: no acute concerns.   Skin: No rashes        OBJECTIVE:  /78  Pulse 98  Temp 98.7  F (37.1  C) (Oral)  Resp 18  Wt 180 lb 8 oz (81.9 kg)  SpO2 99%  BMI 25.9 kg/m2  Body mass index is 25.9 kg/(m^2).    EXAM:  GENERAL APPEARANCE: healthy, alert and no distress  EYES: Eyes grossly normal to inspection, PERRL and conjunctivae and sclerae normal  NEURO: Normal strength and tone, mentation intact and speech normal  MENTAL STATUS EXAM:  Appearance/Behavior: No apparent distress  Speech: Normal  Mood/Affect: normal affect  Insight: Adequate        ASSESSMENT:Alcohol use disorder, severe, dependence (H)        ENCOUNTER FOR LONG TERM USE OF HIGH RISK MEDICATION   High Risk Drug Monitoring?  YES   Drug being monitored: Vivitrol   Reason for drug: Alcohol Use Disorder   What is being monitored?: Dosage, Cravings,  Trigger, side effects, and continued abstinence.      PLAN:  Continue Vivitrol monthly.  Risks, benefits, side effects and intended purposes discussed.    Effect on opioids reviewed.    Follow up visit six month or sooner with concerns.   Continue treatment as planned.   Encourage meeting attendance and sponsorship    Encouraged Nicotine Abstinence.  Increase risk of relapse with other substances with return to nicotine use discussed.  Risk of Ecig/Vaping also reviewed.      Monitor for anxiety sx. Continue current medications.      No orders of the defined types were placed in this encounter.        Dana Hanna MD  [facility]

## 2017-07-24 NOTE — PROGRESS NOTES
Infusion Nursing Note:  Lance Perdomo presents today for vivitrol.    Patient seen by provider today: No   present during visit today: Not Applicable.    Note: Patient denies any alcohol or drug use in last month. Patient tolerated injection last month.    Intravenous Access:  No Intravenous access/labs at this visit.    Treatment Conditions:  Not Applicable.      Post Infusion Assessment:  Patient tolerated injection without incident.    Discharge Plan:   Patient discharged in stable condition accompanied by: self.  Departure Mode: Ambulatory.    Francisca Singer RN

## 2017-07-24 NOTE — MR AVS SNAPSHOT
"              After Visit Summary   7/24/2017    Lance Perdomo    MRN: [id number]           Patient Information     Date Of Birth          1980        Visit Information        Provider Department      7/24/2017 1:40 PM Dana Hanna MD [facility]        Today's Diagnoses     Alcohol use disorder, severe, dependence (H)           Follow-ups after your visit        Your next 10 appointments already scheduled     Aug 25, 2017  9:30 AM CDT   Level O with UR CH 01   [facility] ([facility])    [address] [place] [address]   [phone]              Who to contact     If you have questions or need follow up information about today's clinic visit or your schedule please contact [facility] PRIMARY [facility] directly at [phone].  Normal or non-critical lab and imaging results will be communicated to you by MyChart, letter or phone within 4 business days after the clinic has received the results. If you do not hear from us within 7 days, please contact the clinic through MyChart or phone. If you have a critical or abnormal lab result, we will notify you by phone as soon as possible.  Submit refill requests through [employer] or call your pharmacy and they will forward the refill request to us. Please allow 3 business days for your refill to be completed.          Additional Information About Your Visit        MyChart Information     [employer] lets you send messages to your doctor, view your test results, renew your prescriptions, schedule appointments and more. To sign up, go to [email or web address] . Click on \"Log in\" on the left side of the screen, which will take you to the Welcome page. Then click on \"Sign up Now\" on the right side of the page.     You will be asked to enter the access code listed below, as well as some personal information. Please follow the directions " to create your username and password.     Your access code is: [email or web address]  Expires: 10/22/2017  3:08 PM     Your access code will  in 90 days. If you need help or a new code, please call your [facility] clinic or [phone].        Care EveryWhere ID     This is your Care EveryWhere ID. This could be used by other organizations to access your [facility] medical records  [id number]        Your Vitals Were     Pulse Temperature Respirations Pulse Oximetry BMI (Body Mass Index)       98 98.7  F (37.1  C) (Oral) 18 99% 25.9 kg/m2        Blood Pressure from Last 3 Encounters:   17 121/67   17 120/78   17 116/78    Weight from Last 3 Encounters:   17 180 lb 8 oz (81.9 kg)   17 175 lb 8 oz (79.6 kg)   17 175 lb (79.4 kg)              We Performed the Following     Urine Drugs of Abuse Screen Panel 13        Primary Care Provider Office Phone # Fax #    Crowsamaria Roberts [phone]       [facility] [address] [facility] [address]  [facility] [address]        Equal Access to Services     CASS BROWNING : Hadii aad ku hadasho Soomaali, waaxda luqadaha, qaybta kaalmada adeegyada, waxay idiin hayemoryn trino naranjo. So [facility] [phone].    ATENCIÓN: Si habla español, tiene a syed disposición servicios gratuitos de asistencia lingüística. Llame al [phone].    We comply with applicable federal civil rights laws and [place] laws. We do not discriminate on the basis of race, color, national origin, age, disability sex, sexual orientation or gender identity.            Thank you!     Thank you for choosing [facility] PRIMARY [facility]ARE  for your care. Our goal is always to provide you with excellent care. Hearing back from our patients is one way we can continue to improve our services. Please take a few minutes to complete the written survey that you may receive in the mail after your visit with us. Thank you!             Your Updated Medication List -  Protect others around you: Learn how to safely use, store and throw away your medicines at www.disposemymeds.org.          This list is accurate as of: 7/24/17 11:59 PM.  Always use your most recent med list.                   Brand Name Dispense Instructions for use Diagnosis    CITALOPRAM HYDROBROMIDE PO      Take 20 mg by mouth Take one and one half tablets (30 mg) by mouth every day        gabapentin 300 MG capsule    NEURONTIN    90 capsule    Take 1 capsule (300 mg) by mouth 3 times daily as needed    Social anxiety disorder       ibuprofen 600 MG tablet    ADVIL/MOTRIN    20 tablet    Take 1 tablet (600 mg) by mouth every 6 hours as needed for moderate pain        nicotine polacrilex 2 MG gum    [employer] NICOTINE POLACRILEX    30 tablet    Place 1 each (2 mg) inside cheek as needed for smoking cessation    Social anxiety disorder       QUEtiapine 25 MG tablet    SEROQUEL    30 tablet    Take 1 tablet (25 mg) by mouth nightly as needed    Social anxiety disorder

## 2017-07-24 NOTE — MR AVS SNAPSHOT
"              After Visit Summary   7/24/2017    Lance Perdomo    MRN: [id number]           Patient Information     Date Of Birth          1980        Visit Information        Provider Department      7/24/2017 2:30 PM UR [facility] 04 [facility]        Today's Diagnoses     Alcohol use disorder, severe, dependence (H)    -  1       Follow-ups after your visit        Your next 10 appointments already scheduled     Aug 25, 2017  9:30 AM CDT   Level O with UR CH 01   [facility] ([facility])    [address] [place] [address]   [phone]              Who to contact     If you have questions or need follow up information about today's clinic visit or your schedule please contact [facility], INF[facility]ION SERVICES directly at [phone].  Normal or non-critical lab and imaging results will be communicated to you by MyChart, letter or phone within 4 business days after the clinic has received the results. If you do not hear from us within 7 days, please contact the clinic through MyChart or phone. If you have a critical or abnormal lab result, we will notify you by phone as soon as possible.  Submit refill requests through [employer] or call your pharmacy and they will forward the refill request to us. Please allow 3 business days for your refill to be completed.          Additional Information About Your Visit        MyChart Information     [employer] lets you send messages to your doctor, view your test results, renew your prescriptions, schedule appointments and more. To sign up, go to [email or web address] . Click on \"Log in\" on the left side of the screen, which will take you to the Welcome page. Then click on \"Sign up Now\" on the right side of the page.     You will be asked to enter the access code listed below, as well as some personal information. Please follow the directions to create your username " and password.     Your access code is: [email or web address]  Expires: 10/22/2017  3:08 PM     Your access code will  in 90 days. If you need help or a new code, please call your [place] clinic or [phone].        Care EveryWhere ID     This is your Care EveryWhere ID. This could be used by other organizations to access your [place] medical records  [id number]        Your Vitals Were     Pulse Temperature Respirations Pulse Oximetry          70 97.9  F (36.6  C) 16 97%         Blood Pressure from Last 3 Encounters:   17 121/67   17 120/78   17 116/78    Weight from Last 3 Encounters:   17 81.9 kg (180 lb 8 oz)   17 79.6 kg (175 lb 8 oz)   17 79.4 kg (175 lb)              Today, you had the following     No orders found for display       Primary Care Provider Office Phone # Fax #    Crow Roberts [phone]       [facility] [address] [facility] [address]  [facility] [address]        Equal Access to Services     [facility]: Hadii aad ku hadasho Soomaali, waaxda luqadaha, qaybta kaalmada adeegyada, wendie kayin hayemoryn adejoan san . So [facility] [phone].    ATENCIÓN: Si habla español, tiene a syed disposición servicios gratuitos de asistencia lingüística. Llame al [phone].    We comply with applicable federal civil rights laws and [place] laws. We do not discriminate on the basis of race, color, national origin, age, disability sex, sexual orientation or gender identity.            Thank you!     Thank you for choosing [facility]  for your care. Our goal is always to provide you with excellent care. Hearing back from our patients is one way we can continue to improve our services. Please take a few minutes to complete the written survey that you may receive in the mail after your visit with us. Thank you!             Your Updated Medication List - Protect others around you: Learn how to safely use, store and throw away  your medicines at www.disposemymeds.org.          This list is accurate as of: 7/24/17  3:08 PM.  Always use your most recent med list.                   Brand Name Dispense Instructions for use Diagnosis    CITALOPRAM HYDROBROMIDE PO      Take 20 mg by mouth Take one and one half tablets (30 mg) by mouth every day        gabapentin 300 MG capsule    NEURONTIN    90 capsule    Take 1 capsule (300 mg) by mouth 3 times daily as needed    Social anxiety disorder       ibuprofen 600 MG tablet    ADVIL/MOTRIN    20 tablet    Take 1 tablet (600 mg) by mouth every 6 hours as needed for moderate pain        nicotine polacrilex 2 MG gum    [employer] NICOTINE POLACRILEX    30 tablet    Place 1 each (2 mg) inside cheek as needed for smoking cessation    Social anxiety disorder       QUEtiapine 25 MG tablet    SEROQUEL    30 tablet    Take 1 tablet (25 mg) by mouth nightly as needed    Social anxiety disorder

## 2017-08-25 ENCOUNTER — INFUSION THERAPY VISIT (OUTPATIENT)
Dept: INFUSION THERAPY | Facility: CLINIC | Age: 37
End: 2017-08-25
Attending: PEDIATRICS
Payer: COMMERCIAL

## 2017-08-25 VITALS — TEMPERATURE: 98.2 F | DIASTOLIC BLOOD PRESSURE: 75 MMHG | SYSTOLIC BLOOD PRESSURE: 131 MMHG | HEART RATE: 65 BPM

## 2017-08-25 DIAGNOSIS — F10.20 ALCOHOL USE DISORDER, SEVERE, DEPENDENCE (H): Primary | ICD-10-CM

## 2017-08-25 PROCEDURE — 25000128 H RX IP 250 OP 636: Performed by: PEDIATRICS

## 2017-08-25 PROCEDURE — 96372 THER/PROPH/DIAG INJ SC/IM: CPT

## 2017-08-25 RX ADMIN — NALTREXONE 380 MG: KIT at 09:36

## 2017-08-25 NOTE — MR AVS SNAPSHOT
"              After Visit Summary   8/25/2017    Lance Perdomo    MRN: [id number]           Patient Information     Date Of Birth          1980        Visit Information        Provider Department      8/25/2017 9:30 AM [facility]        Today's Diagnoses     Alcohol use disorder, severe, dependence (H)    -  1       Follow-ups after your visit        Your next 10 appointments already scheduled     Sep 22, 2017 10:00 AM CDT   Level O with UR CH 04   [facility] ([facility])    [address] [place] [address]   [phone]              Who to contact     If you have questions or need follow up information about today's clinic visit or your schedule please contact [facility] directly at [phone].  Normal or non-critical lab and imaging results will be communicated to you by MyChart, letter or phone within 4 business days after the clinic has received the results. If you do not hear from us within 7 days, please contact the clinic through MyChart or phone. If you have a critical or abnormal lab result, we will notify you by phone as soon as possible.  Submit refill requests through [employer] or call your pharmacy and they will forward the refill request to us. Please allow 3 business days for your refill to be completed.          Additional Information About Your Visit        MyChart Information     [employer] lets you send messages to your doctor, view your test results, renew your prescriptions, schedule appointments and more. To sign up, go to [email or web address] . Click on \"Log in\" on the left side of the screen, which will take you to the Welcome page. Then click on \"Sign up Now\" on the right side of the page.     You will be asked to enter the access code listed below, as well as some personal information. Please follow the directions to create your username " and password.     Your access code is: [email or web address]  Expires: 10/22/2017  3:08 PM     Your access code will  in 90 days. If you need help or a new code, please call your [place] clinic or [phone].        Care EveryWhere ID     This is your Care EveryWhere ID. This could be used by other organizations to access your [place] medical records  [id number]        Your Vitals Were     Pulse Temperature                65 98.2  F (36.8  C) (Oral)           Blood Pressure from Last 3 Encounters:   17 131/75   17 121/67   17 120/78    Weight from Last 3 Encounters:   17 81.9 kg (180 lb 8 oz)   17 79.6 kg (175 lb 8 oz)   17 79.4 kg (175 lb)              Today, you had the following     No orders found for display       Primary Care Provider Office Phone # Fax #    Crow ROSALINA Roberts [phone]       [facility] [address] [facility] 100  [facility] [address]        Equal Access to Services     [facility] : Hadii aad ku hadasho Soomaali, waaxda luqadaha, qaybta kaalmada adeegyada, wendie ybarra haykellie san . So [facility] [phone].    ATENCIÓN: Si habla español, tiene a syed disposición servicios gratuitos de asistencia lingüística. Llame al [phone].    We comply with applicable federal civil rights laws and [place] laws. We do not discriminate on the basis of race, color, national origin, age, disability sex, sexual orientation or gender identity.            Thank you!     Thank you for choosing [facility]  for your care. Our goal is always to provide you with excellent care. Hearing back from our patients is one way we can continue to improve our services. Please take a few minutes to complete the written survey that you may receive in the mail after your visit with us. Thank you!             Your Updated Medication List - Protect others around you: Learn how to safely use, store and throw away your medicines at  www.disposemymeds.org.          This list is accurate as of: 8/25/17 10:17 AM.  Always use your most recent med list.                   Brand Name Dispense Instructions for use Diagnosis    CITALOPRAM HYDROBROMIDE PO      Take 20 mg by mouth Take one and one half tablets (30 mg) by mouth every day        gabapentin 300 MG capsule    NEURONTIN    90 capsule    Take 1 capsule (300 mg) by mouth 3 times daily as needed    Social anxiety disorder       ibuprofen 600 MG tablet    ADVIL/MOTRIN    20 tablet    Take 1 tablet (600 mg) by mouth every 6 hours as needed for moderate pain        nicotine polacrilex 2 MG gum    CVS NICOTINE POLACRILEX    30 tablet    Place 1 each (2 mg) inside cheek as needed for smoking cessation    Social anxiety disorder       QUEtiapine 25 MG tablet    SEROQUEL    30 tablet    Take 1 tablet (25 mg) by mouth nightly as needed    Social anxiety disorder

## 2017-08-25 NOTE — PROGRESS NOTES
Lance presents today for a Vivitrol injection.   Patient seen by provider today: No   present during visit today: Not Applicable.  Note: Pt received injection on right, tolerated well.  ?  Intravenous Access:  No Intravenous access/labs at this visit.  Treatment Conditions:  Not Applicable.  ?  Post Infusion Assessment:  Patient tolerated injection without incident.  ?  Discharge Plan:   Patient discharged in stable condition accompanied by: self.  ?  Nitza Salomon

## 2017-09-22 ENCOUNTER — INFUSION THERAPY VISIT (OUTPATIENT)
Dept: INFUSION THERAPY | Facility: CLINIC | Age: 37
End: 2017-09-22
Attending: PEDIATRICS
Payer: COMMERCIAL

## 2017-09-22 DIAGNOSIS — F10.20 ALCOHOL USE DISORDER, SEVERE, DEPENDENCE (H): Primary | ICD-10-CM

## 2017-09-22 PROCEDURE — 25000128 H RX IP 250 OP 636: Performed by: PEDIATRICS

## 2017-09-22 PROCEDURE — 96372 THER/PROPH/DIAG INJ SC/IM: CPT

## 2017-09-22 RX ADMIN — NALTREXONE 380 MG: KIT at 10:22

## 2017-09-22 NOTE — MR AVS SNAPSHOT
"              After Visit Summary   9/22/2017    Lance Perdomo    MRN: [id number]           Patient Information     Date Of Birth          1980        Visit Information        Provider Department      9/22/2017 10:00 AM UR  04 [facility]        Today's Diagnoses     Alcohol use disorder, severe, dependence (H)    -  1       Follow-ups after your visit        Your next 10 appointments already scheduled     Oct 20, 2017 10:00 AM CDT   Level O with UR  04   [facility] ([facility])    [address] [place] [address]   [phone]              Who to contact     If you have questions or need follow up information about today's clinic visit or your schedule please contact [facility] directly at [phone].  Normal or non-critical lab and imaging results will be communicated to you by MyChart, letter or phone within 4 business days after the clinic has received the results. If you do not hear from us within 7 days, please contact the clinic through MyChart or phone. If you have a critical or abnormal lab result, we will notify you by phone as soon as possible.  Submit refill requests through [employer] or call your pharmacy and they will forward the refill request to us. Please allow 3 business days for your refill to be completed.          Additional Information About Your Visit        MyChart Information     [employer] lets you send messages to your doctor, view your test results, renew your prescriptions, schedule appointments and more. To sign up, go to [email or web address] . Click on \"Log in\" on the left side of the screen, which will take you to the Welcome page. Then click on \"Sign up Now\" on the right side of the page.     You will be asked to enter the access code listed below, as well as some personal information. Please follow the directions to create your username " and password.     Your access code is: [email or web address]  Expires: 10/22/2017  3:08 PM     Your access code will  in 90 days. If you need help or a new code, please call your [place] clinic or [phone].        Care EveryWhere ID     This is your Care EveryWhere ID. This could be used by other organizations to access your [place] medical records  [id number]         Blood Pressure from Last 3 Encounters:   17 131/75   17 121/67   17 120/78    Weight from Last 3 Encounters:   17 81.9 kg (180 lb 8 oz)   17 79.6 kg (175 lb 8 oz)   17 79.4 kg (175 lb)              Today, you had the following     No orders found for display       Primary Care Provider Office Phone # Fax #    Crow Roberts [phone]       [place] [address] [address] [address]        Equal Access to Services     [facility]: Hadii aad ku hadasho Soomaali, waaxda luqadaha, qaybta kaalmada adeegyada, waxay rahulin haykellie san . So [facility] [phone].    ATENCIÓN: Si habla español, tiene a syed disposición servicios gratuitos de asistencia lingüística. Llame al [phone].    We comply with applicable federal civil rights laws and [place] laws. We do not discriminate on the basis of race, color, national origin, age, disability sex, sexual orientation or gender identity.            Thank you!     Thank you for choosing [facility]  for your care. Our goal is always to provide you with excellent care. Hearing back from our patients is one way we can continue to improve our services. Please take a few minutes to complete the written survey that you may receive in the mail after your visit with us. Thank you!             Your Updated Medication List - Protect others around you: Learn how to safely use, store and throw away your medicines at www.disposemymeds.org.          This list is accurate as of: 17 10:50 AM.  Always use your  most recent med list.                   Brand Name Dispense Instructions for use Diagnosis    CITALOPRAM HYDROBROMIDE PO      Take 20 mg by mouth Take one and one half tablets (30 mg) by mouth every day        gabapentin 300 MG capsule    NEURONTIN    90 capsule    Take 1 capsule (300 mg) by mouth 3 times daily as needed    Social anxiety disorder       ibuprofen 600 MG tablet    ADVIL/MOTRIN    20 tablet    Take 1 tablet (600 mg) by mouth every 6 hours as needed for moderate pain        nicotine polacrilex 2 MG gum    CVS NICOTINE POLACRILEX    30 tablet    Place 1 each (2 mg) inside cheek as needed for smoking cessation    Social anxiety disorder       QUEtiapine 25 MG tablet    SEROQUEL    30 tablet    Take 1 tablet (25 mg) by mouth nightly as needed    Social anxiety disorder

## 2017-09-22 NOTE — PROGRESS NOTES
Pt here for vivitrol injection.  Reports no relapses since last injection.  Says he was very sore for several days after last injection.  Had constant pain that interfered with walking and sitting and other movement.    Med injected into L glut without difficulty.  Massaged site. Encouraged pt to massage site to decrease pain.  RTC in 4 weeks.

## 2017-10-01 ENCOUNTER — HOSPITAL ENCOUNTER (EMERGENCY)
Facility: CLINIC | Age: 37
Discharge: HOME | End: 2017-10-01
Attending: EMERGENCY MEDICINE | Admitting: EMERGENCY MEDICINE
Payer: COMMERCIAL

## 2017-10-01 VITALS
SYSTOLIC BLOOD PRESSURE: 115 MMHG | HEART RATE: 72 BPM | RESPIRATION RATE: 16 BRPM | OXYGEN SATURATION: 97 % | TEMPERATURE: 98.2 F | DIASTOLIC BLOOD PRESSURE: 79 MMHG

## 2017-10-01 DIAGNOSIS — Z78.9 ALCOHOL USE: ICD-10-CM

## 2017-10-01 DIAGNOSIS — F10.11 HISTORY OF ALCOHOL ABUSE: ICD-10-CM

## 2017-10-01 LAB — ALCOHOL BREATH TEST: 0.03 (ref 0–0.01)

## 2017-10-01 PROCEDURE — 99282 EMERGENCY DEPT VISIT SF MDM: CPT | Mod: Z6 | Performed by: EMERGENCY MEDICINE

## 2017-10-01 PROCEDURE — 99283 EMERGENCY DEPT VISIT LOW MDM: CPT | Performed by: EMERGENCY MEDICINE

## 2017-10-01 PROCEDURE — 82075 ASSAY OF BREATH ETHANOL: CPT | Performed by: EMERGENCY MEDICINE

## 2017-10-01 ASSESSMENT — ENCOUNTER SYMPTOMS
DIARRHEA: 0
COUGH: 0
HEADACHES: 0
SORE THROAT: 0
ABDOMINAL PAIN: 0
FEVER: 0
NAUSEA: 0
RHINORRHEA: 1
ARTHRALGIAS: 0
CHILLS: 0
DIFFICULTY URINATING: 0
CONFUSION: 0
SHORTNESS OF BREATH: 0
COLOR CHANGE: 0
VOMITING: 0

## 2017-10-01 NOTE — LETTER
To Whom it may concern:      Lance Perdomo was seen in our Emergency Department today, 10/01/17.  He reports that he consumed 4 beers today, last alcohol consumption around 4:00 PM. At 10:45 PM, his breathalyzer was 0.03.  He has not had alcohol for 113 days prior to today. He is not at risk for withdrawal, he does not need detox.  He is not a danger to himself or anyone else.  He can go back to his treatment facility.\    Sincerely,        Jessie Cruz MD

## 2017-10-01 NOTE — ED AVS SNAPSHOT
[facility], Emergency Department    [address]    [facility] [place] [place] [address]    Phone:  [phone]    Fax:  [phone]                                       Lance Perdomo   MRN: [id number]    Department:  [facility], Emergency Department   Date of Visit:  10/1/2017           Patient Information     Date Of Birth          1980        Your diagnoses for this visit were:     Alcohol use     History of alcohol abuse        You were seen by Jessie Cruz MD.        Discharge Instructions       You have been seen in the ER for alcohol use.  Your breathalyzer today was 0.031.  You are not legally or clinically intoxicated.  You do not need detox.  It is safe for you to go back to your treatment facility.      Discharge References/Attachments     ALCOHOLISM, UNDERSTANDING (ENGLISH)    ALCOHOLISM: HOW TO BE PART OF THE SOLUTION (ENGLISH)      Future Appointments        Provider Department Dept Phone Center    10/20/2017 10:00 AM UR CHAIR 04 [facility], Infusion Services [phone] [place]      24 Hour Appointment Hotline       To make an appointment at any [facility] clinic, call [phone] ([phone]). If you don't have a family doctor or clinic, we will help you find one. [facility] clinics are conveniently located to serve the needs of you and your family.             Review of your medicines      Our records show that you are taking the medicines listed below. If these are incorrect, please call your family doctor or clinic.        Dose / Directions Last dose taken    CITALOPRAM HYDROBROMIDE PO   Dose:  20 mg        Take 20 mg by mouth Take one and one half tablets (30 mg) by mouth every day   Refills:  0        gabapentin 300 MG capsule   Commonly known as:  NEURONTIN   Dose:  300 mg   Quantity:  90 capsule        Take 1 capsule (300 mg) by mouth 3 times daily as needed   Refills:  1        ibuprofen 600 MG tablet   Commonly known as:  ADVIL/MOTRIN   Dose:  600 mg   Quantity:   "20 tablet        Take 1 tablet (600 mg) by mouth every 6 hours as needed for moderate pain   Refills:  0        nicotine polacrilex 2 MG gum   Commonly known as:  CVS NICOTINE POLACRILEX   Dose:  2 mg   Quantity:  30 tablet        Place 1 each (2 mg) inside cheek as needed for smoking cessation   Refills:  1        QUEtiapine 25 MG tablet   Commonly known as:  SEROQUEL   Dose:  25 mg   Quantity:  30 tablet        Take 1 tablet (25 mg) by mouth nightly as needed   Refills:  1                Procedures and tests performed during your visit     Alcohol breath test POCT      Orders Needing Specimen Collection     None      Pending Results     No orders found from 9/29/2017 to 10/2/2017.            Pending Culture Results     No orders found from 9/29/2017 to 10/2/2017.            Pending Results Instructions     If you had any lab results that were not finalized at the time of your Discharge, you can call the ED Lab Result RN at [phone]. You will be contacted by this team for any positive Lab results or changes in treatment. The nurses are available 7 days a week from 10A to 6:30P.  You can leave a message 24 hours per day and they will return your call.        Thank you for choosing [facility]       Thank you for choosing [facility] for your care. Our goal is always to provide you with excellent care. Hearing back from our patients is one way we can continue to improve our services. Please take a few minutes to complete the written survey that you may receive in the mail after you visit with us. Thank you!        [employer] Information     [employer] lets you send messages to your doctor, view your test results, renew your prescriptions, schedule appointments and more. To sign up, go to [email or web address] . Click on \"Log in\" on the left side of the screen, which will take you to the Welcome page. Then click on \"Sign up Now\" on the right side of the page.     You will be asked to enter the access code listed below, as " well as some personal information. Please follow the directions to create your username and password.     Your access code is: [email or web address]  Expires: 10/22/2017  3:08 PM     Your access code will  in 90 days. If you need help or a new code, please call your [place] clinic or [phone].        Care EveryWhere ID     This is your Care EveryWhere ID. This could be used by other organizations to access your [place] medical records  [id number]        Equal Access to Services     [facility] : Sakina green Sojhon, waaxjuan ramon luqadaha, qaybpaulino kaalmajuan ramon de la fuente, wendie san . So [facility] [phone].    ATENCIÓN: Si habla español, tiene a syed disposición servicios gratuitos de asistencia lingüística. Llame al [phone].    We comply with applicable federal civil rights laws and [place] laws. We do not discriminate on the basis of race, color, national origin, age, disability, sex, sexual orientation, or gender identity.            After Visit Summary       This is your record. Keep this with you and show to your community pharmacist(s) and doctor(s) at your next visit.

## 2017-10-01 NOTE — ED AVS SNAPSHOT
[facility], Emergency Department    [address]    [facility] [address]    Phone:  [phone]    Fax:  [phone]                                       Lance Perdomo   MRN: [id number]    Department:  [facility], Emergency Department   Date of Visit:  10/1/2017           After Visit Summary Signature Page     I have received my discharge instructions, and my questions have been answered. I have discussed any challenges I see with this plan with the nurse or doctor.    ..........................................................................................................................................  Patient/Patient Representative Signature      ..........................................................................................................................................  Patient Representative Print Name and Relationship to Patient    ..................................................               ................................................  Date                                            Time    ..........................................................................................................................................  Reviewed by Signature/Title    ...................................................              ..............................................  Date                                                            Time

## 2017-10-02 NOTE — ED PROVIDER NOTES
History     Chief Complaint   Patient presents with     Alcohol Problem     pt had 3 beers at a football game today, residential treatment facility sent him here for eval. Last drink before today was 113 days ago.     Patient is a 37 year old male presenting with alcohol problem.   Alcohol Problem   Pertinent negatives include no chest pain, no abdominal pain, no headaches and no shortness of breath.     Lance Perdomo is a 37 year old male who presents to the ED for alcohol use.  He has a history of alcohol abuse and is currently in a residential CD treatment facility in [place].  He is receiving monthly Vivitrol injections for his alcohol abuse, last injection 9/22/17.  He went to the [employer] today and consumed 4 beers, last use about 1600 (6 hours prior to arrival).  He went back to his treatment facility where they gave him a breathalyzer and then drove him back here to be medically cleared and to make sure that he does not need detox.  Prior to today last use of alcohol was 113 days ago.  Denies other drug use, denies SI.    Past Medical History:   Diagnosis Date     Alcohol abuse, in remission 12/9/2014     Anxiety 12/9/2014     Depression        Past Surgical History:   Procedure Laterality Date     NO HISTORY OF SURGERY         Family History   Problem Relation Age of Onset     Depression Mother      Anxiety Disorder Mother      Anxiety Disorder Maternal Grandmother      Depression Maternal Grandfather      Depression Paternal Grandmother      Anxiety Disorder Paternal Grandmother      Parkinsonism Paternal Grandfather      Unknown/Adopted Paternal Grandfather      Bipolar Disorder Sister      Schizophrenia Sister      Unknown/Adopted Father      Suicide No family hx of      Substance Abuse No family hx of      Dementia No family hx of      [place] Disease No family hx of      Autism Spectrum Disorder No family hx of      Intellectual Disability (Mental Retardation) No family hx of      MENTAL  ILLNESS No family hx of        Social History   Substance Use Topics     Smoking status: Current Every Day Smoker     Packs/day: 0.10     Years: 20.00     Types: Cigarettes     Smokeless tobacco: Never Used     Alcohol use 0.0 oz/week     0 Standard drinks or equivalent per week      Comment: in treatment, no ETOH for 113 days before today         I have reviewed the Medications, Allergies, Past Medical and Surgical History, and Social History in the Epic system.    Review of Systems   Constitutional: Negative for chills and fever.   HENT: Positive for rhinorrhea. Negative for congestion and sore throat.    Respiratory: Negative for cough and shortness of breath.    Cardiovascular: Negative for chest pain.   Gastrointestinal: Negative for abdominal pain, diarrhea, nausea and vomiting.   Genitourinary: Negative for difficulty urinating.   Musculoskeletal: Negative for arthralgias.   Skin: Negative for color change.   Neurological: Negative for headaches.   Psychiatric/Behavioral: Negative for confusion and suicidal ideas.   All other systems reviewed and are negative.      Physical Exam   BP: 115/79  Pulse: 72  Temp: 98.2  F (36.8  C)  Resp: 16  SpO2: 97 %  Physical Exam   Constitutional: No distress.   Adult male, alert, cooperative, NAD   HENT:   Head: Atraumatic.   Mouth/Throat: Oropharynx is clear and moist. No oropharyngeal exudate.   Eyes: Pupils are equal, round, and reactive to light. No scleral icterus.   Neck: Neck supple.   Cardiovascular: Normal rate, regular rhythm, normal heart sounds and intact distal pulses.    No murmur heard.  Pulmonary/Chest: Effort normal and breath sounds normal. No respiratory distress. He has no wheezes. He has no rales.   Abdominal: Soft. Bowel sounds are normal. There is no tenderness.   Musculoskeletal: He exhibits no edema or tenderness.   Skin: Skin is warm. No rash noted. He is not diaphoretic.   Psychiatric: He has a normal mood and affect.   No SI. Insight good.     Nursing note and vitals reviewed.      ED Course     ED Course     Procedures             Critical Care time:  none             Labs Ordered and Resulted from Time of ED Arrival Up to the Time of Departure from the ED   ALCOHOL BREATH TEST POCT - Abnormal; Notable for the following:        Result Value    Alcohol Breath Test 0.031 (*)     All other components within normal limits            Assessments & Plan (with Medical Decision Making)   Patient presents for alcohol use.  No use of alcohol for 113 days prior to day when he consumed 4 beers.  Breathalyzer here is 0.031.  He is not at risk for withdrawal.  Not legally or clinically intoxicated at present.  His treatment facility staff is here with him and will drive him back to [place].  Given a note that states he is not in need of detox or of medical monitoring for any reason.  Discharged home with treatment facility staff.     I have reviewed the nursing notes.    I have reviewed the findings, diagnosis, plan and need for follow up with the patient.    New Prescriptions    No medications on file       Final diagnoses:   Alcohol use   History of alcohol abuse       10/1/2017   [facility], [place], EMERGENCY DEPARTMENT     Jessie Cruz MD  10/01/17 0531

## 2017-10-02 NOTE — DISCHARGE INSTRUCTIONS
You have been seen in the ER for alcohol use.  Your breathalyzer today was 0.031.  You are not legally or clinically intoxicated.  You do not need detox.  It is safe for you to go back to your treatment facility.

## 2018-02-02 ENCOUNTER — TELEPHONE (OUTPATIENT)
Dept: ADDICTION MEDICINE | Facility: CLINIC | Age: 38
End: 2018-02-02

## 2018-02-02 NOTE — TELEPHONE ENCOUNTER
Reason for Call:  Other     Detailed comments: Pt states that [facility], but was informed that an in order is needed since he has not been seen in a while. Pt is wondering if Dr. Hanna can order his injections without seeing him. Pt does not want to see Dr. Hanna because his insurance is not accepted at [facility]. Writer encouraged pt to switch his insurance, but pt is not interested in doing that. He would like Dr. Hanna to continue ordering his vivitrol injections without seeing him.     Phone Number Patient can be reached at: Home number on file [phone] (home)    Best Time: Anytime    Can we leave a detailed message on this number? YES    Call taken on 2/2/2018 at 11:48 AM by Lizbet Nazario

## 2018-02-05 NOTE — TELEPHONE ENCOUNTER
Called pt and left detailed VM with information provided below and requested pt return call to clinic. Offered in VM for SW assistance to [facility] if he would like.  Wendi Workman RN

## 2018-02-05 NOTE — TELEPHONE ENCOUNTER
Patient has not had injection since September and was in ED after drinking in October.  Has not been seen for injection since.  Would need to be seen to restart injections so would need to change insurance or find a covered provider.  Please discuss with patient.

## 2018-05-22 ENCOUNTER — TELEPHONE (OUTPATIENT)
Dept: ADDICTION MEDICINE | Facility: CLINIC | Age: 38
End: 2018-05-22

## 2018-05-22 NOTE — TELEPHONE ENCOUNTER
Writer informed by [facility] that pt and his mother are calling different [facility] to see if he can receive any Vivitrol shots sooner rather than waiting for [facility] to reply to message.     Aspen Berkowitz

## 2018-05-22 NOTE — TELEPHONE ENCOUNTER
Returned call to patient and referred him to [facility] program through [employer].  They can help locate a provider and help with insurance coverage.

## 2018-05-22 NOTE — TELEPHONE ENCOUNTER
Spoke to patient's mom--gave her the number for [facility] ([phone]) and advised her that we could not take [facility]

## 2018-05-22 NOTE — TELEPHONE ENCOUNTER
Pt call stating that he saw Jacques in July of 2017 for his last Vivitrol injection. He has since tried Naltrexone in pill form and that did not work on the cravings at all. He is requesting either: information on somewhere he can go to get Vivitrol injections or to get a referral From Jacques to a place that he can go.     Pt tel: [phone]  Best time: Anytime    Aspen Berkowitz